# Patient Record
Sex: FEMALE | Race: ASIAN | NOT HISPANIC OR LATINO | Employment: UNEMPLOYED | URBAN - METROPOLITAN AREA
[De-identification: names, ages, dates, MRNs, and addresses within clinical notes are randomized per-mention and may not be internally consistent; named-entity substitution may affect disease eponyms.]

---

## 2022-01-18 ENCOUNTER — MEDICAL CORRESPONDENCE (OUTPATIENT)
Dept: HEALTH INFORMATION MANAGEMENT | Facility: CLINIC | Age: 57
End: 2022-01-18
Payer: COMMERCIAL

## 2022-01-18 ENCOUNTER — TRANSFERRED RECORDS (OUTPATIENT)
Dept: HEALTH INFORMATION MANAGEMENT | Facility: CLINIC | Age: 57
End: 2022-01-18
Payer: COMMERCIAL

## 2022-01-18 LAB
CHOLESTEROL (EXTERNAL): 206 MG/DL
CREATININE (EXTERNAL): 0.88 MG/DL (ref 0.5–1.05)
GFR ESTIMATED (EXTERNAL): 74 ML/MIN/1.73M2
GFR ESTIMATED (IF AFRICAN AMERICAN) (EXTERNAL): 86 ML/MIN/1.73M2
GLUCOSE (EXTERNAL): 99 MG/DL (ref 65–99)
HBA1C MFR BLD: 6 %
HDLC SERPL-MCNC: 73 MG/DL
LDL CHOLESTEROL (EXTERNAL): 97 MG/DL
NON HDL CHOLESTEROL (EXTERNAL): 133 MG/DL
POTASSIUM (EXTERNAL): 3.3 MMOL/L (ref 3.5–5.3)
TRIGLYCERIDES (EXTERNAL): 249 MG/DL
TSH SERPL-ACNC: 0.64 MIU/L

## 2022-02-11 DIAGNOSIS — R05.3 CHRONIC COUGH: Primary | ICD-10-CM

## 2022-03-10 ENCOUNTER — HOSPITAL ENCOUNTER (OUTPATIENT)
Dept: RESPIRATORY THERAPY | Facility: CLINIC | Age: 57
Discharge: HOME | End: 2022-03-10
Admitting: INTERNAL MEDICINE
Payer: COMMERCIAL

## 2022-03-10 DIAGNOSIS — R05.3 CHRONIC COUGH: ICD-10-CM

## 2022-03-10 LAB
DLCOCOR-%PRED-PRE: 165 %
DLCOCOR-PRE: 26.55 ML/MIN/MMHG
DLCOUNC-%PRED-PRE: 170 %
DLCOUNC-PRE: 27.4 ML/MIN/MMHG
DLCOUNC-PRED: 16.05 ML/MIN/MMHG
ERV-%PRED-PRE: 32 %
ERV-PRE: 0.16 L
ERV-PRED: 0.5 L
EXPTIME-PRE: 6.4 SEC
FEF2575-%PRED-POST: 119 %
FEF2575-%PRED-PRE: 98 %
FEF2575-POST: 2.41 L/SEC
FEF2575-PRE: 1.99 L/SEC
FEF2575-PRED: 2.01 L/SEC
FEFMAX-%PRED-PRE: 85 %
FEFMAX-PRE: 4.7 L/SEC
FEFMAX-PRED: 5.52 L/SEC
FEV1-%PRED-PRE: 97 %
FEV1-PRE: 1.87 L
FEV1FEV6-PRE: 83 %
FEV1FEV6-PRED: 82 %
FEV1FVC-PRE: 83 %
FEV1FVC-PRED: 82 %
FEV1SVC-PRE: 91 %
FEV1SVC-PRED: 77 %
FIFMAX-PRE: 3.78 L/SEC
FRCPLETH-%PRED-PRE: 85 %
FRCPLETH-PRE: 1.97 L
FRCPLETH-PRED: 2.3 L
FVC-%PRED-PRE: 96 %
FVC-PRE: 2.27 L
FVC-PRED: 2.35 L
HGB BLD-MCNC: 14.5 G/DL (ref 11.7–15.7)
IC-%PRED-PRE: 95 %
IC-PRE: 1.9 L
IC-PRED: 1.99 L
RVPLETH-%PRED-PRE: 120 %
RVPLETH-PRE: 1.81 L
RVPLETH-PRED: 1.5 L
TLCPLETH-%PRED-PRE: 102 %
TLCPLETH-PRE: 3.87 L
TLCPLETH-PRED: 3.77 L
VA-%PRED-PRE: 109 %
VA-PRE: 3.91 L
VC-%PRED-PRE: 82 %
VC-PRE: 2.06 L
VC-PRED: 2.49 L

## 2022-03-10 PROCEDURE — 85018 HEMOGLOBIN: CPT | Performed by: INTERNAL MEDICINE

## 2022-03-10 PROCEDURE — 94060 EVALUATION OF WHEEZING: CPT | Mod: 26 | Performed by: INTERNAL MEDICINE

## 2022-03-10 PROCEDURE — 94060 EVALUATION OF WHEEZING: CPT

## 2022-03-10 PROCEDURE — 94726 PLETHYSMOGRAPHY LUNG VOLUMES: CPT

## 2022-03-10 PROCEDURE — 94729 DIFFUSING CAPACITY: CPT

## 2022-03-10 PROCEDURE — 999N000157 HC STATISTIC RCP TIME EA 10 MIN

## 2022-03-10 PROCEDURE — 250N000009 HC RX 250: Performed by: INTERNAL MEDICINE

## 2022-03-10 PROCEDURE — 94729 DIFFUSING CAPACITY: CPT | Mod: 26 | Performed by: INTERNAL MEDICINE

## 2022-03-10 PROCEDURE — 36415 COLL VENOUS BLD VENIPUNCTURE: CPT | Performed by: INTERNAL MEDICINE

## 2022-03-10 PROCEDURE — 94726 PLETHYSMOGRAPHY LUNG VOLUMES: CPT | Mod: 26 | Performed by: INTERNAL MEDICINE

## 2022-03-10 RX ORDER — ALBUTEROL SULFATE 0.83 MG/ML
2.5 SOLUTION RESPIRATORY (INHALATION) ONCE
Status: COMPLETED | OUTPATIENT
Start: 2022-03-10 | End: 2022-03-10

## 2022-03-10 RX ADMIN — ALBUTEROL SULFATE 2.5 MG: 2.5 SOLUTION RESPIRATORY (INHALATION) at 09:42

## 2022-03-10 NOTE — PROGRESS NOTES
RESPIRATORY CARE NOTE     Patient Name: Tucker Foreman  Today's Date: 3/10/2022     Complete PFT done with son interpreting. Pt performed tests with good effort. Test results meet ATS criteria. Pt did have 3 puff Ventolin inhaler 2 hrs before PFT test. Results scanned into epic. Pt left in no distress.     Liliana Blevins, RT

## 2022-03-15 ENCOUNTER — OFFICE VISIT (OUTPATIENT)
Dept: PULMONOLOGY | Facility: OTHER | Age: 57
End: 2022-03-15
Payer: COMMERCIAL

## 2022-03-15 ENCOUNTER — HOSPITAL ENCOUNTER (OUTPATIENT)
Dept: RADIOLOGY | Facility: HOSPITAL | Age: 57
Discharge: HOME | End: 2022-03-15
Attending: INTERNAL MEDICINE | Admitting: INTERNAL MEDICINE
Payer: COMMERCIAL

## 2022-03-15 VITALS
WEIGHT: 140 LBS | DIASTOLIC BLOOD PRESSURE: 80 MMHG | SYSTOLIC BLOOD PRESSURE: 122 MMHG | BODY MASS INDEX: 28.22 KG/M2 | RESPIRATION RATE: 16 BRPM | HEART RATE: 86 BPM | HEIGHT: 59 IN

## 2022-03-15 DIAGNOSIS — R05.3 CHRONIC COUGH: Primary | ICD-10-CM

## 2022-03-15 DIAGNOSIS — R05.3 CHRONIC COUGH: ICD-10-CM

## 2022-03-15 PROCEDURE — 71046 X-RAY EXAM CHEST 2 VIEWS: CPT

## 2022-03-15 PROCEDURE — 99204 OFFICE O/P NEW MOD 45 MIN: CPT | Performed by: INTERNAL MEDICINE

## 2022-03-15 RX ORDER — QUETIAPINE FUMARATE 50 MG/1
50 TABLET, FILM COATED ORAL AT BEDTIME
COMMUNITY

## 2022-03-15 RX ORDER — CITALOPRAM HYDROBROMIDE 40 MG/1
40 TABLET ORAL DAILY
COMMUNITY

## 2022-03-15 RX ORDER — IBUPROFEN 800 MG/1
800 TABLET, FILM COATED ORAL EVERY 8 HOURS PRN
COMMUNITY

## 2022-03-15 RX ORDER — PANTOPRAZOLE SODIUM 40 MG/1
40 TABLET, DELAYED RELEASE ORAL DAILY
COMMUNITY
End: 2022-03-15

## 2022-03-15 RX ORDER — LOSARTAN POTASSIUM 100 MG/1
100 TABLET ORAL DAILY
COMMUNITY

## 2022-03-15 RX ORDER — BUDESONIDE AND FORMOTEROL FUMARATE DIHYDRATE 160; 4.5 UG/1; UG/1
2 AEROSOL RESPIRATORY (INHALATION) 2 TIMES DAILY
COMMUNITY

## 2022-03-15 RX ORDER — CETIRIZINE HYDROCHLORIDE 10 MG/1
10 TABLET ORAL DAILY
COMMUNITY

## 2022-03-15 RX ORDER — PANTOPRAZOLE SODIUM 40 MG/1
40 TABLET, DELAYED RELEASE ORAL 2 TIMES DAILY
Qty: 60 TABLET | Refills: 3 | Status: SHIPPED | OUTPATIENT
Start: 2022-03-15 | End: 2022-04-14

## 2022-03-15 RX ORDER — ALBUTEROL SULFATE 90 UG/1
2 AEROSOL, METERED RESPIRATORY (INHALATION) EVERY 6 HOURS PRN
COMMUNITY

## 2022-03-15 NOTE — PATIENT INSTRUCTIONS
It was a pleasure to see you today.    Please increase the pantoprazole from once per day to twice per day every day  We will do an x-ray of your lungs  Please stop the symbicort inhaler, if your cough worsens, please resume it      Follow up in 4-6 weeks. We will consider CT scan of the lungs at that time.        Mariela Tyler MD  Pulmonary and Critical Care Medicine  [facility]  Office: [phone]

## 2022-03-15 NOTE — LETTER
3/15/2022         RE: Tucker Foreman  [address]  [place] [address]        Dear Colleague,    Thank you for referring your patient, Tucker Foreman, to the [facility]. Please see a copy of my visit note below.    Pulmonary Clinic Outpatient Consultation    Assessment and Plan:   57 y F with a history of HTN, HLD, GERD, allergic rhinitis, presenting for an evaluation of chronic cough x 1 year.     Testing and symptoms are noable for GERD symptoms despite once daily PPI. No chest imaging. PFTs with elevated diffusion capacity, otherwise normal. Has not responded to trial of inhalers, adherent to symbicort BID for > 1 month.     We discussed in detail today that the most common etiologies for a chronic cough originate in the upper airway (UACS) rather than the lungs, we can evaluate/rule out any primary pulmonary causes. We also discussed that it is common for chronic cough to have multifactorial etiologies, and we often need to treat/target multiple causes, and even in that case it can be difficult to eliminate the cough entirely if it has been present for a long time.      Recommendations:    Increase PPI to  BID    Trial off inhalers - stop symbicort. Monitor symptoms, resume if cough worsens    Chest x-ray     CT chest if above unremarkable/no benefit    If negative pulmonary evaluation, referral to ENT/Dr. Evelina Rodriguez      Follow up in 4 weeks.    Mariela Tyler MD  Pulmonary and Critical Care Medicine  [facility]  Office: [phone]    ----------------------    Reason for Consult: Chronic cough    HPI:   57 y F with a history of HTN, HLD, GERD, allergic rhinitis, presenting for an evaluation of chronic cough x 1 year. Seen with [employer] .    Feels she has been coughing since she started BP medications  Switched from ACEi to ARB a year ago, no help  Using inhalers as prescribed, BID with symbicort, every day for  Albuterol as needed, some nights   Outpatient  "notes mention CXR, don't see any results from this, she does not recall having this done    Duration of cough: 1 year  Productive: Yes, clear phlegm  What has been tried for cough: symbicort, albuterol, anti-histamine, PPI  Anything helps or worsens: No  Prior ENT or allergy referral: No    Post nasal drip: No  Coughing with oral intake: No  Seasonal allergies: yes   Wheezing/shortness of breath: No  GERD symptoms: Yes  Worse at night: No  Personal or FH of asthma: No    ROS:  A 12-system review was obtained and was negative with the exception of the symptoms endorsed in the history of present illness.    PMH:  HTN, HLD, GERD, allergic rhinitis     PSH:  Related to ovarian cysts    Allergies:  NKDA    Family HX:  No known FH of asthma    Social Hx:  Social History     Socioeconomic History     Marital status: Single     Spouse name: Not on file     Number of children: Not on file     Years of education: Not on file     Highest education level: Not on file   Occupational History     Not on file   Tobacco Use     Smoking status: Never Smoker     Smokeless tobacco: Never Used   Substance and Sexual Activity     Alcohol use: Not on file     Drug use: Not on file     Sexual activity: Not on file   Other Topics Concern     Not on file   Social History Narrative     Not on file     Social Determinants of Health     Financial Resource Strain: Not on file   Food Insecurity: Not on file   Transportation Needs: Not on file   Physical Activity: Not on file   Stress: Not on file   Social Connections: Not on file   Intimate Partner Violence: Not on file   Housing Stability: Not on file   Tobacco: Never smoker  Denies drug, or EtOH abuse  Denies environmental exposure history  From [facility] in [place]  No pets at home    Current Meds:  Reviewed    Physical Exam:  /80 (BP Location: Left arm, Patient Position: Chair, Cuff Size: Adult Regular)   Pulse 86   Resp 16   Ht 1.499 m (4' 11\")   Wt 63.5 kg (140 lb)   BMI " 28.28 kg/m    Gen: Alert, oriented, no distress  HEENT: nasal turbinates are unremarkable, no oropharyngeal lesions, no cervical or supraclavicular lymphadenopathy  CV: RRR, no M/G/R  Resp: CTAB, no focal crackles or wheezes  Abd: soft, nontender, no palpable organomegaly  Skin: no apparent rashes  Ext: warm, no edema  Neuro: alert, nonfocal    Labs:  Reviewed    Imaging studies:    No prior chest imaging    PFT's   Elevated DLCO 165%  Otherwise normal           Again, thank you for allowing me to participate in the care of your patient.        Sincerely,        Mariela Tyler MD

## 2022-03-15 NOTE — PROGRESS NOTES
Pulmonary Clinic Outpatient Consultation    Assessment and Plan:   57 y F with a history of HTN, HLD, GERD, allergic rhinitis, presenting for an evaluation of chronic cough x 1 year.     Testing and symptoms are noable for GERD symptoms despite once daily PPI. No chest imaging. PFTs with elevated diffusion capacity, otherwise normal. Has not responded to trial of inhalers, adherent to symbicort BID for > 1 month.     We discussed in detail today that the most common etiologies for a chronic cough originate in the upper airway (UACS) rather than the lungs, we can evaluate/rule out any primary pulmonary causes. We also discussed that it is common for chronic cough to have multifactorial etiologies, and we often need to treat/target multiple causes, and even in that case it can be difficult to eliminate the cough entirely if it has been present for a long time.      Recommendations:    Increase PPI to  BID    Trial off inhalers - stop symbicort. Monitor symptoms, resume if cough worsens    Chest x-ray     CT chest if above unremarkable/no benefit    If negative pulmonary evaluation, referral to ENT/Dr. Evelina Rodriguez      Follow up in 4 weeks.    Mariela Tyler MD  Pulmonary and Critical Care Medicine  [facility]  Office: [phone]    ----------------------    Reason for Consult: Chronic cough    HPI:   57 y F with a history of HTN, HLD, GERD, allergic rhinitis, presenting for an evaluation of chronic cough x 1 year. Seen with [facility] .    Feels she has been coughing since she started BP medications  Switched from ACEi to ARB a year ago, no help  Using inhalers as prescribed, BID with symbicort, every day for  Albuterol as needed, some nights   Outpatient notes mention CXR, don't see any results from this, she does not recall having this done    Duration of cough: 1 year  Productive: Yes, clear phlegm  What has been tried for cough: symbicort, albuterol, anti-histamine, PPI  Anything helps or  "worsens: No  Prior ENT or allergy referral: No    Post nasal drip: No  Coughing with oral intake: No  Seasonal allergies: yes   Wheezing/shortness of breath: No  GERD symptoms: Yes  Worse at night: No  Personal or FH of asthma: No    ROS:  A 12-system review was obtained and was negative with the exception of the symptoms endorsed in the history of present illness.    PMH:  HTN, HLD, GERD, allergic rhinitis     PSH:  Related to ovarian cysts    Allergies:  NKDA    Family HX:  No known FH of asthma    Social Hx:  Social History     Socioeconomic History     Marital status: Single     Spouse name: Not on file     Number of children: Not on file     Years of education: Not on file     Highest education level: Not on file   Occupational History     Not on file   Tobacco Use     Smoking status: Never Smoker     Smokeless tobacco: Never Used   Substance and Sexual Activity     Alcohol use: Not on file     Drug use: Not on file     Sexual activity: Not on file   Other Topics Concern     Not on file   Social History Narrative     Not on file     Social Determinants of Health     Financial Resource Strain: Not on file   Food Insecurity: Not on file   Transportation Needs: Not on file   Physical Activity: Not on file   Stress: Not on file   Social Connections: Not on file   Intimate Partner Violence: Not on file   Housing Stability: Not on file   Tobacco: Never smoker  Denies drug, or EtOH abuse  Denies environmental exposure history  From [facility] in [facility]  No pets at home    Current Meds:  Reviewed    Physical Exam:  /80 (BP Location: Left arm, Patient Position: Chair, Cuff Size: Adult Regular)   Pulse 86   Resp 16   Ht 1.499 m (4' 11\")   Wt 63.5 kg (140 lb)   BMI 28.28 kg/m    Gen: Alert, oriented, no distress  HEENT: nasal turbinates are unremarkable, no oropharyngeal lesions, no cervical or supraclavicular lymphadenopathy  CV: RRR, no M/G/R  Resp: CTAB, no focal crackles or wheezes  Abd: soft, " nontender, no palpable organomegaly  Skin: no apparent rashes  Ext: warm, no edema  Neuro: alert, nonfocal    Labs:  Reviewed    Imaging studies:    No prior chest imaging    PFT's   Elevated DLCO 165%  Otherwise normal

## 2022-03-21 ENCOUNTER — TELEPHONE (OUTPATIENT)
Dept: PULMONOLOGY | Facility: OTHER | Age: 57
End: 2022-03-21
Payer: COMMERCIAL

## 2022-03-21 NOTE — TELEPHONE ENCOUNTER
Called patient with [facility]  to reviewed normal CXR result.  Confirmed with the patient she stopped inhalers and increased the PPI to BID. Cough is unchanged with these interventions.     Discussed CT scan of the chest as next step. She declined to do this at this time.  Has follow up in our clinic 4/26. Continue BID PPI until then. Will likely need further referral for UACS etiologies.       Mariela Tyler MD  Pulmonary and Critical Care Medicine  [facility]  Office: [phone]

## 2022-04-22 PROBLEM — I10 ESSENTIAL HYPERTENSION: Status: ACTIVE | Noted: 2018-08-17

## 2022-04-22 RX ORDER — CARBOXYMETHYLCELLULOSE SODIUM 0.5 G/100ML
SOLUTION/ DROPS OPHTHALMIC
COMMUNITY
Start: 2021-07-22

## 2022-04-22 RX ORDER — LOSARTAN POTASSIUM 50 MG/1
TABLET ORAL
COMMUNITY
Start: 2021-12-02

## 2022-04-22 RX ORDER — METOPROLOL SUCCINATE 100 MG/1
TABLET, EXTENDED RELEASE ORAL
COMMUNITY
Start: 2022-03-15

## 2022-04-22 RX ORDER — POLYETHYLENE GLYCOL AND PROPYLENE GLYCOL 4; 3 MG/ML; MG/ML
SOLUTION/ DROPS OPHTHALMIC
COMMUNITY
Start: 2022-01-19

## 2022-04-22 RX ORDER — TOPIRAMATE 25 MG/1
TABLET, FILM COATED ORAL
COMMUNITY
Start: 2022-03-02

## 2022-04-22 RX ORDER — CHLORAL HYDRATE 500 MG
CAPSULE ORAL
COMMUNITY
Start: 2021-06-22

## 2022-04-22 NOTE — PROGRESS NOTES
Pulmonary Clinic Outpatient Follow-Up  4/26/2022     Assessment and Plan:   #. Chronic cough, unchanged with and without high-dose ICS/LABA inhaler. At this point we have no evidence that there is an asthmatic component to her symptoms.   #. GERD, currently on PPI twice daily, but without any change to her cough.        Tessalon TID PRN -- Rx sent, can be refilled by her PCP in the future    ENT [facility] to the [facility]    RTC: PRN. If patient has no success w/ Tessalon & ENT, she is willing to consider a CT chest (non-contrast). I am concerned that it will be low-yield. If she is interested in doing this, I would request that her PCP order the study & notify either myself or Dr. Tyler when it is done -- we would be happy to review & offer any pulmonary guidance, if applicable. Otherwise, patient is safe to discharge from our clinic.     Ila Garcia MD  Pulmonary and Critical Care   ______________________________________________________________________________    CC: follow up    HPI:   Tucker Foreman is a 57 year old never smoker with history of HTN, GERD, allergic rhinitis, and chronic cough, presenting today for follow up of her cough.  She was seen by Dr. Tyler on 3/15/2022; at that time she was noted to have symptomatic GERD despite daily PPI, normal PFTs with elevated diffusion capacity, and lack of response to Symbicort.  During the appointment her PPI was increased to twice daily and her Symbicort was stopped (with a plan to restart if her cough recurred).  On a follow-up phone call in a week her cough has remained unchanged, patient declined chest CT at that time.    Reports that she still has her cough. It did not change w/ BID pantoprazole. She has not used Symbicort for a month & her cough is unchanged. This is obviously frustrating. She is hoping that we can get her something that would offer her symptomatic relief while she is getting her evaluations. She is willing to try Tessalon, w/ an  understanding that it may or may not work.     REVIEW OF SYSTEMS: 10-point ROS was negative with exceptions as detailed in the HPI.    Physical Exam:  BP (!) 157/93 (BP Location: Right arm, Patient Position: Sitting, Cuff Size: Adult Large)   Pulse 86   Wt 62.8 kg (138 lb 6 oz)   SpO2 96%   BMI 27.95 kg/m    Gen: alert, oriented, no distress  HEENT: masked, PERLL; no stridor, no cough during our visit  CV: deferred   Resp: talking in full sentences w/ no respiratory distress  Abd: deferred  Skin: no apparent rashes  Ext: no cyanosis, clubbing or edema  Neuro: alert, nonfocal    Labs: personally reviewed & interpreted in EMR.   Imaging & Procedures: personally reviewed & interpreted, including formal Radiology reports in the EMR.    MEDICAL HISTORY: Hypertension, chronic cough, left-sided ovarian cyst, costochondritis, headaches, hyperlipidemia, depression, PTSD, GERD, LGSIL Pap smear, allergic rhinitis, back pain  SURGICAL HISTORY: Left-sided laparoscopic salpingooophorectomy  SOCIAL HISTORY:  reports that she has never smoked. She has never used smokeless tobacco.  She does not drink alcohol.  FAMILY HISTORY: family history is not on file.    MEDICATIONS: personally reviewed, including EMR/Care Everywhere. Pertinent information noted & updated.   ALLERGIES: No Known Allergies

## 2022-04-26 ENCOUNTER — OFFICE VISIT (OUTPATIENT)
Dept: PULMONOLOGY | Facility: OTHER | Age: 57
End: 2022-04-26
Payer: COMMERCIAL

## 2022-04-26 VITALS
HEART RATE: 86 BPM | SYSTOLIC BLOOD PRESSURE: 157 MMHG | WEIGHT: 138.38 LBS | DIASTOLIC BLOOD PRESSURE: 93 MMHG | BODY MASS INDEX: 27.95 KG/M2 | OXYGEN SATURATION: 96 %

## 2022-04-26 DIAGNOSIS — R05.9 COUGH: Primary | ICD-10-CM

## 2022-04-26 PROCEDURE — 99213 OFFICE O/P EST LOW 20 MIN: CPT | Performed by: INTERNAL MEDICINE

## 2022-04-26 RX ORDER — BENZONATATE 200 MG/1
200 CAPSULE ORAL 3 TIMES DAILY PRN
Qty: 90 CAPSULE | Refills: 4 | Status: SHIPPED | OUTPATIENT
Start: 2022-04-26

## 2022-04-26 NOTE — PATIENT INSTRUCTIONS
Plan:  I am sending you a medication to see if it can help with your cough. You can take one tablet AS NEEDED up to 3 times per day.   I am putting a referral to cough specialists in the ENT (Ear Nose & Throat) clinic at the [facility]. You will get a call from the clinic to schedule this.    If you have any questions or concerns, please, call our clinic at [phone].

## 2022-05-02 ENCOUNTER — APPOINTMENT (OUTPATIENT)
Dept: INTERPRETER SERVICES | Facility: CLINIC | Age: 57
End: 2022-05-02
Payer: COMMERCIAL

## 2022-05-02 ENCOUNTER — TRANSFERRED RECORDS (OUTPATIENT)
Dept: OTOLARYNGOLOGY | Facility: CLINIC | Age: 57
End: 2022-05-02

## 2022-05-05 ENCOUNTER — TRANSFERRED RECORDS (OUTPATIENT)
Dept: HEALTH INFORMATION MANAGEMENT | Facility: CLINIC | Age: 57
End: 2022-05-05
Payer: COMMERCIAL

## 2022-05-16 NOTE — TELEPHONE ENCOUNTER
FUTURE VISIT INFORMATION      FUTURE VISIT INFORMATION:    Date: 8/4/22    Time: 10AM    Location: [facility]  REFERRAL INFORMATION:    Referring provider:  Ila Garcia MD    Referring providers clinic:  [facility]     Reason for visit/diagnosis  chronic cough, ruled out pulmonary & GERD component, ef by Ila Garcia MD in [facility], recs in epic - sched per pt    RECORDS REQUESTED FROM:       Clinic name Comments Records Status Imaging Status    [facility]  4/26/22 note and referral from Ila Garcia MD Epic    Imaging 3/15/22 XR Chest [facility]

## 2022-06-08 ENCOUNTER — HOSPITAL ENCOUNTER (OUTPATIENT)
Dept: ULTRASOUND IMAGING | Facility: HOSPITAL | Age: 57
Discharge: HOME | End: 2022-06-08
Attending: FAMILY MEDICINE | Admitting: FAMILY MEDICINE
Payer: COMMERCIAL

## 2022-06-08 DIAGNOSIS — R09.89 RIGHT CAROTID BRUIT: ICD-10-CM

## 2022-06-08 PROCEDURE — 93880 EXTRACRANIAL BILAT STUDY: CPT

## 2022-08-02 NOTE — PROGRESS NOTES
[facility]   at the [facility]   Otolaryngology Clinic     Patient: Tucker Foreman    MRN: [id number]    : 1965    Age/Gender: 57 year old female  Date of Service: 2022  Rendering Provider:   Evelina Rodriguez MD     Referring Provider   PCP: Roxana Lee [facility]  Referring Physician: Ila Garcia MD  [address]  [place] [address]  Reason for Consultation   Chronic cough  Dyspnea  History   HISTORY OF PRESENT ILLNESS: Ms. Foreman is a 57 year old female who presents to us today with chronic cough.      Of note she has a history of GERD on daily PPI and no history of asthma. Her preferred language is Hmong.     Her PCP Dr. Roxana Lee is at:  [place] [id number] [phone]    she presents today for evaluation. she reports:    - she has intermittent rhinorrhea  - she doesn't use any medications in her nose    - she started losartan last recently   - decreased blood pressure to 120's   - visited PCP last week who recommended continuing this  - has seen her current PCP since  when she moved to [place] from [place]  - her children at home can understand English  - her son arrived to the appointment with her     Dysphonia  - denies   - comes on when she coughs a lot     Dysphagia  - denies    Dyspnea  - denies  - she was taking inhalers   - discontinued this as they were not working       Throat clearing/cough  - unable to sleep due to cough   - wakes up from coughing when she is sleeping  - she stays up until 3-4 AM   - scared to sleep due to cough   - wakes up a few hours later with cough   - cough is then constant throughout day   - cough is not worsened with eating   - cough improves if she swallows  - carries water bottle around with her at all times  - if she drinks water once coughing starts cough improves  - throat feels dry   - drinks lots of water to help with throat pain   - no voice changes after  episodes of severe coughing     GERD/LPRD   - history of GERD  - not taking any medications for this  - taking medication for ulcer  - been having stomach pain   - denies history of GI scope exam   - pain in her stomach/chest when she is hungry or eats too much  - her  has similar issues and takes the same medication    PAST MEDICAL HISTORY: hypertension, left ovarian cyst, chronic cough, hyperlipidemia, depression, PTSD, GERD, pap smear abnormality of cervix with LGSIL, allergic rhinitis    PAST SURGICAL HISTORY: laparoscopic left salpingo-oophorectomy     CURRENT MEDICATIONS:   Current Outpatient Medications:      albuterol (PROAIR HFA/PROVENTIL HFA/VENTOLIN HFA) 108 (90 Base) MCG/ACT inhaler, Inhale 2 puffs into the lungs every 6 hours as needed for shortness of breath / dyspnea or wheezing, Disp: , Rfl:      benzonatate (TESSALON) 200 MG capsule, Take 1 capsule (200 mg) by mouth 3 times daily as needed for cough, Disp: 90 capsule, Rfl: 4     budesonide-formoterol (SYMBICORT) 160-4.5 MCG/ACT Inhaler, Inhale 2 puffs into the lungs 2 times daily, Disp: , Rfl:      cetirizine (ZYRTEC) 10 MG tablet, Take 10 mg by mouth daily, Disp: , Rfl:      citalopram (CELEXA) 40 MG tablet, Take 40 mg by mouth daily, Disp: , Rfl:      EYE ITCH RELIEF 0.025 % ophthalmic solution, PLACE 1 DROP IN EACH EYE 2 TIMES A DAY (EVERY 8 TO 12 HOURS) AS NEEDED, Disp: , Rfl:      fish oil-omega-3 fatty acids 1000 MG capsule, TAKE 1 PILL BY MOUTH TWICE DAILY/ TXHUA HNUB NOJ 1 LUB 2 ZAUG PAB [facility] ROJ, Disp: , Rfl:      ibuprofen (ADVIL/MOTRIN) 800 MG tablet, Take 800 mg by mouth every 8 hours as needed for moderate pain, Disp: , Rfl:      losartan (COZAAR) 100 MG tablet, Take 100 mg by mouth daily, Disp: , Rfl:      losartan (COZAAR) 50 MG tablet, TAKE 1 PILL BY MOUTH DAILY FOR BLOOD PRESSURE / TXHUA HNUB NOJ 1 LUB TSHUAJ PAB MARIA ALEJANDRAO JERMAN [facility] SIAB, Disp: , Rfl:      LUBRICATING PLUS EYE DROPS 0.5 % ophthalmic solution, PF  FORMULATION. PUT 1-2 DROPS IN EACH EYE AS NEEDED. DISCARD EACH VIAL AFTER SINGLE USE., Disp: , Rfl:      metoprolol succinate ER (TOPROL-XL) 100 MG 24 hr tablet, TAKE 1 PILL BY MOUTH DAILY FOR BLOOD PRESSURE / TXHUA HNUB NOJ 1 LUB [facility] ELIEL BESSO JERMAN NTSHAV SIAB, Disp: , Rfl:      NONFORMULARY, OTC dry eyes drops as needed, Disp: , Rfl:      polyethylene glycol-propylene glycol (SYSTANE ULTRA) 0.4-0.3 % SOLN ophthalmic solution, Place 1 drop into both eyes 4 times daily as needed for dry eyes, Disp: , Rfl:      QUEtiapine (SEROQUEL) 50 MG tablet, Take 50 mg by mouth At Bedtime, Disp: , Rfl:      SYSTANE PRESERVATIVE FREE 0.4-0.3 % SOLN opthalmic solution, PUT 1-2 DROPS IN EACH EYE 4 TIMES A DAY AS NEEDED. DISCARD EACH VIAL AFTER SINGLE USE, Disp: , Rfl:      topiramate (TOPAMAX) 25 MG tablet, TAKE 1 PILL BY MOUTH EVERY DAY FOR HEADACHE/TXHUA HNUB NOJ 1 LUB [facility] JERMAN [facility]OB MELISSA GREGORIO *NEEDS TO BE SEEN*, Disp: , Rfl:     ALLERGIES: Patient has no known allergies.    SOCIAL HISTORY:    Social History     Socioeconomic History     Marital status: Single     Spouse name: Not on file     Number of children: Not on file     Years of education: Not on file     Highest education level: Not on file   Occupational History     Not on file   Tobacco Use     Smoking status: Never Smoker     Smokeless tobacco: Never Used   Substance and Sexual Activity     Alcohol use: Not on file     Drug use: Not on file     Sexual activity: Not on file   Other Topics Concern     Not on file   Social History Narrative     Not on file     Social Determinants of Health     Financial Resource Strain: Not on file   Food Insecurity: Not on file   Transportation Needs: Not on file   Physical Activity: Not on file   Stress: Not on file   Social Connections: Not on file   Intimate Partner Violence: Not on file   Housing Stability: Not on file         FAMILY HISTORY: No family history on file.  Non-contributory for problems with anesthesia    REVIEW OF  SYSTEMS:   The patient was asked a 14 point review of systems regarding constitutional symptoms, eye symptoms, ears, nose, mouth, throat symptoms, cardiovascular symptoms, respiratory symptoms, gastrointestinal symptoms, genitourinary symptoms, musculoskeletal symptoms, integumentary symptoms, neurological symptoms, psychiatric symptoms, endocrine symptoms, hematologic/lymphatic symptoms, and allergic/ immunologic symptoms.   The pertinent factors have been included in the HPI and below.  Patient Supplied Answers to Review of Systems  No flowsheet data found.    Physical Examination   The patient underwent a physical examination as described below. The pertinent positive and negative findings are summarized after the description of the examination.  Constitutional: The patient's developmental and nutritional status was assessed. The patient's voice quality was assessed.  Head and Face: The head and face were inspected for deformities. The sinuses were palpated. The salivary glands were palpated. Facial muscle strength was assessed bilaterally.  Eyes: Extraocular movements and primary gaze alignment were assessed.  Ears, Nose, Mouth and Throat: The ears and nose were examined for deformities. The nasal septum, mucosa, and turbinates were inspected by anterior rhinoscopy. The lips, teeth, and gums were examined for abnormalities. The oral mucosa, tongue, palate, tonsils, lateral and posterior pharynx were inspected for the presence of asymmetry or mucosal lesions.    Neck: The tracheal position was noted, and the neck mass palpated to determine if there were any asymmetries, abnormal neck masses, thyromegally, or thyroid nodules.  Respiratory: The nature of the breathing and chest expansion/symmetry was observed.  Cardiovascular: The patient was examined to determine the presence of any edema or jugular venous distension.  Abdomen: The contour of the abdomen was noted.  Lymphatic: The patient was examined for  infraclavicular lymphadenopathy.  Musculoskeletal: The patient was inspected for the presence of skeletal deformities.  Extremities: The extremities were examined for any clubbing or cyanosis.  Skin: The skin was examined for inflammatory or neoplastic conditions.  Neurologic: The patient's orientation, mood, and affect were noted. The cranial nerve  functions were examined.  Other pertinent positive and negative findings on physical examination:      OC/OP: no lesions, uvula midline, soft palate elevates symmetrically   Neck: no lesions, no TH tenderness to palpation     All other physical examination findings were within normal limits and noncontributory.  Procedures     Flexible laryngoscopy (CPT 32852)      Pre-procedure diagnosis: chronic cough  Post-procedure diagnosis: same as above  Indication for procedure: Ms. Foreman is a 57 year old female with see above  Procedure(s): Fiberoptic Laryngoscopy    Details of Procedure: After informed consent was obtained, the patient was seated in the examination chair.  The areas of the nasopharynx as well as the hypopharynx were anesthetized with topical 4% lidocaine with 0.25% phenylephrine atomizer.  Examination of the base of tongue was performed first.  Attention was directed to any evidence of masses in the area or evidence of leukoplakia or candidal infection.  Attention was directed to the epiglottis where its size and position was determined and its movement on phonation of the vowel  e .  The piriform sinuses were then inspected for any mass lesions or pooling of secretions.  Attention was then directed to the larynx. The vocal folds were inspected for infection or any areas of leukoplakia, for masses, polypoid degeneration, or hemorrhage.  Having done this, the arytenoids and vocal processes were inspected for erythema or evidence of granuloma formation.  The posterior commissure was then inspected for evidence of inflammatory changes in the mucosa and heaping up  of mucosal tissue. The patient was then instructed to say the vowel  e .  Adduction of vocal folds to the midline was observed for any evidence of paresis or paralysis of the larynx or asymmetry in rotation of the larynx to the left or right. The patient was asked to breathe and the degree of abduction was noted bilaterally.  Subglottic view of the larynx was obtained for any additional mass lesions or mucosal changes.  Finally the post cricoid was examined for evidence of pooling of secretions, as well as the pharyngeal wall mucosa.   Anesthesia type: 0.25% phenylephrine    Findings:  Anatomic/physiological deviations: LNC, postcricoid edema, vocal fold with mild erythema with question of fungal laryngitis    Right vocal process: No restriction of mobility   Left vocal process: No restriction of mobility  Glottal gap: Complete glottal closure  Supraglottic structures: Normal  Hypopharynx: Normal     Estimated Blood Loss: minimal  Complications: None  Disposition: Patient tolerated the procedure well                      Fiberoptic Endoscopic Evaluation of Swallowing (CPT 96007)  and Interpretation of Swallowing (CPT 46848)    Indications: See above notes for complete history and physical. Patient complains of dysphagia to both solids and liquids and/or there is suggestion on history and endoscopic exam of the presence of dysphagia causing medical complaints.  Swallowing evaluation is being performed to assess the presence and degree of dysphagia, and to recommend a safe diet.     Pulmonary Status:  No PNA   Current Diet:              regular                                        thin liquids      Consistency Amounts:  Thin Liquid: sip   Puree: 1 tsp  Solid: cookies            Positioning: upright in a chair  Oral Peripheral Exam: See physical exam section.  Anatomic Notes: See Videostroboscopy report for assessment of anatomy and laryngeal functioning  Pharyngeal secretions prior to administration of liquid or  food: No  Oral Phase Abnormal Findings: No abnormal behavior observed  Behavioral Adaptations: No abnormal behavior observed  Pharyngeal Phase Abnormal Findings: no penetration, no aspiration    Recommended Diet:  regular                                        thin liquids             Review of Relevant Clinical Data   I personally reviewed:  Notes:   Pulmonology 4/26/22  Chronic cough, unchanged with and without high-dose ICS/LABA inhaler. At this point we have no evidence that there is an asthmatic component to her symptoms.   #. GERD, currently on PPI twice daily, but without any change to her cough.      Tessalon TID PRN -- Rx sent, can be refilled by her PCP in the future    [facility]  RTC: PRN. If patient has no success w/ Tessalon & ENT, she is willing to consider a CT chest (non-contrast). I am concerned that it will be low-yield. If she is interested in doing this, I would request that her PCP order the study & notify either myself or Dr. Tyler when it is done -- we would be happy to review & offer any pulmonary guidance, if applicable. Otherwise, patient is safe to discharge from our clinic.     Radiology:   XR Chest 3/15/22  IMPRESSION: Normal cardiac and mediastinal contours. The lungs are symmetrically inflated and are clear.  Upper abdomen is unremarkable.     Procedures:   PFT 3/10/22   IMPRESSION:   Normal pulmonary function.     Labs:  No results found for: TSH  No results found for: NA, CO2, BUN, CREAT, GLUCOSE, PHOS  Lab Results   Component Value Date    HGB 14.5 03/10/2022     No results found for: PT, PTT, INR  No results found for: HARVEY  No components found for: RHEUMATOIDFACTOR,  RF  No results found for: CRP  No components found for: CKTOT, URICACID  No components found for: C3, C4, DSDNAAB, NDNAABIFA  No results found for: MPOAB    Patient reported Quality of Life (QOL) Measures   Patient Supplied Answers To VHI Questionnaire  No flowsheet data  found.      Patient Supplied Answers To EAT Questionnaire  No flowsheet data found.      Patient Supplied Answers To CSI Questionnaire  No flowsheet data found.         Impression & Plan     IMPRESSION: Ms. Foreman is a 57 year old female who is being seen for the followin. Chronic cough  - started in  in the setting of blood pressure medication   - never used cigarettes  - chest xray  - clear  - on losartan  - allergy triggers and work up: denies  - pulmonary triggers and work up: PFTs 3/10/22 - normal, referred by Dr Garcia, tried inhalers without benefit  - GI triggers and work up: does feel reflux, was told she might have an ulcer but she does not have an EGD, was given reflux meds by her primary, she can't sleep at night due to cough, cough is worse in the morning   - ENT triggers and work up: worse with talking  - scope shows post cricoid ededma, vocal fold with mild erythema with question of fungal laryngitis  - FEES shows safe swallow   - symptoms likely due to ARB BP medication, reflux etiology and irritable larynx syndrome, also has vocal fold erythema so will treat for possible bacterial or fungal laryngitis given history of steroid   - will treat for reflux and obtain esophageal work up   - needs to come off ARB since it can contribute to her cough  Plan  - referral for Dr. Solo Bautista for EGD, pH testing and manometry   - discontinue losartan, no ARB or ACE-I type blood pressure medications - will forward this request to her PCP  - will report name, dosing, and frequency of reflux medication - need to confirm BID PPI dosing  - if not on this - she will need omeprazole 40mg BID prescribed  - will prescribe Augmentin, nystatin, and fluconazole       RETURN VISIT:  at 2 PM    Scribe Disclosure:  Debora OROZCO am serving as a scribe to document services personally performed by Evelina Rodriguez MD at this visit, based upon the provider's statements to me. All documentation has been  reviewed by the aforementioned provider prior to being entered into the official medical record.       Thank you for the kind referral and for allowing me to share in the care of Ms. Foreman. If you have any questions, please do not hesitate to contact me.    Evelina Rodriguez MD    Laryngology    [facility]  Department of  Otolaryngology - Head and Neck Surgery  Clinics & Surgery Center  [address] [place] [address]  Appointment line: [phone]  Fax: [phone]  [email or web address]

## 2022-08-04 ENCOUNTER — OFFICE VISIT (OUTPATIENT)
Dept: OTOLARYNGOLOGY | Facility: CLINIC | Age: 57
End: 2022-08-04
Attending: INTERNAL MEDICINE
Payer: COMMERCIAL

## 2022-08-04 ENCOUNTER — PRE VISIT (OUTPATIENT)
Dept: OTOLARYNGOLOGY | Facility: CLINIC | Age: 57
End: 2022-08-04
Payer: COMMERCIAL

## 2022-08-04 ENCOUNTER — TELEPHONE (OUTPATIENT)
Dept: OTOLARYNGOLOGY | Facility: CLINIC | Age: 57
End: 2022-08-04

## 2022-08-04 ENCOUNTER — THERAPY VISIT (OUTPATIENT)
Dept: SPEECH THERAPY | Facility: CLINIC | Age: 57
End: 2022-08-04
Payer: COMMERCIAL

## 2022-08-04 ENCOUNTER — DOCUMENTATION ONLY (OUTPATIENT)
Dept: OTOLARYNGOLOGY | Facility: CLINIC | Age: 57
End: 2022-08-04

## 2022-08-04 VITALS
SYSTOLIC BLOOD PRESSURE: 172 MMHG | BODY MASS INDEX: 26.13 KG/M2 | HEIGHT: 59 IN | DIASTOLIC BLOOD PRESSURE: 96 MMHG | HEART RATE: 74 BPM | WEIGHT: 129.6 LBS | OXYGEN SATURATION: 97 %

## 2022-08-04 DIAGNOSIS — R05.3 CHRONIC COUGH: Primary | ICD-10-CM

## 2022-08-04 DIAGNOSIS — R49.0 DYSPHONIA: ICD-10-CM

## 2022-08-04 DIAGNOSIS — B37.89 LARYNGEAL CANDIDIASIS: ICD-10-CM

## 2022-08-04 DIAGNOSIS — J38.7 IRRITABLE LARYNX SYNDROME: ICD-10-CM

## 2022-08-04 DIAGNOSIS — R05.9 COUGH: Primary | ICD-10-CM

## 2022-08-04 DIAGNOSIS — K21.9 LARYNGOPHARYNGEAL REFLUX: ICD-10-CM

## 2022-08-04 PROCEDURE — 99204 OFFICE O/P NEW MOD 45 MIN: CPT | Mod: 25 | Performed by: OTOLARYNGOLOGY

## 2022-08-04 PROCEDURE — 92610 EVALUATE SWALLOWING FUNCTION: CPT | Mod: GN | Performed by: SPEECH-LANGUAGE PATHOLOGIST

## 2022-08-04 PROCEDURE — 92524 BEHAVRAL QUALIT ANALYS VOICE: CPT | Mod: GN | Performed by: SPEECH-LANGUAGE PATHOLOGIST

## 2022-08-04 PROCEDURE — 92613 ENDOSCOPY SWALLOW (FEES) I&R: CPT | Performed by: OTOLARYNGOLOGY

## 2022-08-04 PROCEDURE — 92612 ENDOSCOPY SWALLOW (FEES) VID: CPT | Mod: GN | Performed by: OTOLARYNGOLOGY

## 2022-08-04 RX ORDER — FLUCONAZOLE 100 MG/1
TABLET ORAL
Qty: 11 TABLET | Refills: 0 | Status: SHIPPED | OUTPATIENT
Start: 2022-08-04 | End: 2022-08-23

## 2022-08-04 RX ORDER — NYSTATIN 100000/ML
SUSPENSION, ORAL (FINAL DOSE FORM) ORAL 4 TIMES DAILY
Qty: 280 ML | Refills: 0 | Status: SHIPPED | OUTPATIENT
Start: 2022-08-04 | End: 2022-08-23

## 2022-08-04 RX ORDER — NYSTATIN 100000/ML
SUSPENSION, ORAL (FINAL DOSE FORM) ORAL 4 TIMES DAILY
Qty: 280 ML | Refills: 0 | Status: SHIPPED | OUTPATIENT
Start: 2022-08-04 | End: 2022-08-04

## 2022-08-04 ASSESSMENT — PAIN SCALES - GENERAL: PAINLEVEL: NO PAIN (0)

## 2022-08-04 NOTE — LETTER
2022       RE: Tucker Foreman  [address]  [place] [address]     Dear Colleague,    Thank you for referring your patient, Tucker Foreman, to the [facility] EAR NOSE AND THROAT CLINIC [place] at [facility]. Please see a copy of my visit note below.        [facility]   at the [facility]   Otolaryngology Clinic     Patient: Tucker Foreman    MRN: [id number]    : 1965    Age/Gender: 57 year old female  Date of Service: 2022  Rendering Provider:   Evelina Rodriguez MD     Referring Provider   PCP: Roxana Lee [place] [facility]  Referring Physician: Ila Garcia MD  [address] [address]  Reason for Consultation   Chronic cough  Dyspnea  History   HISTORY OF PRESENT ILLNESS: Ms. Foreman is a 57 year old female who presents to us today with chronic cough.      Of note she has a history of GERD on daily PPI and no history of asthma. Her preferred language is Hmong.     Her PCP Dr. Roxana Lee is at:  [place] [phone]    she presents today for evaluation. she reports:    - she has intermittent rhinorrhea  - she doesn't use any medications in her nose    - she started losartan last recently   - decreased blood pressure to 120's   - visited PCP last week who recommended continuing this  - has seen her current PCP since  when she moved to [place] from [place]  - her children at home can understand English  - her son arrived to the appointment with her     Dysphonia  - denies   - comes on when she coughs a lot     Dysphagia  - denies    Dyspnea  - denies  - she was taking inhalers   - discontinued this as they were not working       Throat clearing/cough  - unable to sleep due to cough   - wakes up from coughing when she is sleeping  - she stays up until 3-4 AM   - scared to sleep due to cough   - wakes up a few hours later with cough   - cough  is then constant throughout day   - cough is not worsened with eating   - cough improves if she swallows  - carries water bottle around with her at all times  - if she drinks water once coughing starts cough improves  - throat feels dry   - drinks lots of water to help with throat pain   - no voice changes after episodes of severe coughing     GERD/LPRD   - history of GERD  - not taking any medications for this  - taking medication for ulcer  - been having stomach pain   - denies history of GI scope exam   - pain in her stomach/chest when she is hungry or eats too much  - her  has similar issues and takes the same medication    PAST MEDICAL HISTORY: hypertension, left ovarian cyst, chronic cough, hyperlipidemia, depression, PTSD, GERD, pap smear abnormality of cervix with LGSIL, allergic rhinitis    PAST SURGICAL HISTORY: laparoscopic left salpingo-oophorectomy     CURRENT MEDICATIONS:   Current Outpatient Medications:      albuterol (PROAIR HFA/PROVENTIL HFA/VENTOLIN HFA) 108 (90 Base) MCG/ACT inhaler, Inhale 2 puffs into the lungs every 6 hours as needed for shortness of breath / dyspnea or wheezing, Disp: , Rfl:      benzonatate (TESSALON) 200 MG capsule, Take 1 capsule (200 mg) by mouth 3 times daily as needed for cough, Disp: 90 capsule, Rfl: 4     budesonide-formoterol (SYMBICORT) 160-4.5 MCG/ACT Inhaler, Inhale 2 puffs into the lungs 2 times daily, Disp: , Rfl:      cetirizine (ZYRTEC) 10 MG tablet, Take 10 mg by mouth daily, Disp: , Rfl:      citalopram (CELEXA) 40 MG tablet, Take 40 mg by mouth daily, Disp: , Rfl:      EYE ITCH RELIEF 0.025 % ophthalmic solution, PLACE 1 DROP IN EACH EYE 2 TIMES A DAY (EVERY 8 TO 12 HOURS) AS NEEDED, Disp: , Rfl:      fish oil-omega-3 fatty acids 1000 MG capsule, TAKE 1 PILL BY MOUTH TWICE DAILY/ ROEL RANDOLPH NOJ 1 LUB 2 KATHY MARTINV MUSARAHJ ROAV, Disp: , Rfl:      ibuprofen (ADVIL/MOTRIN) 800 MG tablet, Take 800 mg by mouth every 8 hours as needed for moderate pain,  Disp: , Rfl:      losartan (COZAAR) 100 MG tablet, Take 100 mg by mouth daily, Disp: , Rfl:      losartan (COZAAR) 50 MG tablet, TAKE 1 PILL BY MOUTH DAILY FOR BLOOD PRESSURE / TXHUA HNUB NOJ 1 LUB [facility] SIAB, Disp: , Rfl:      LUBRICATING PLUS EYE DROPS 0.5 % ophthalmic solution, PF FORMULATION. PUT 1-2 DROPS IN EACH EYE AS NEEDED. DISCARD EACH VIAL AFTER SINGLE USE., Disp: , Rfl:      metoprolol succinate ER (TOPROL-XL) 100 MG 24 hr tablet, TAKE 1 PILL BY MOUTH DAILY FOR BLOOD PRESSURE / TXHUA HNUB NOJ 1 [facility] SIAB, Disp: , Rfl:      NONFORMULARY, OTC dry eyes drops as needed, Disp: , Rfl:      polyethylene glycol-propylene glycol (SYSTANE ULTRA) 0.4-0.3 % SOLN ophthalmic solution, Place 1 drop into both eyes 4 times daily as needed for dry eyes, Disp: , Rfl:      QUEtiapine (SEROQUEL) 50 MG tablet, Take 50 mg by mouth At Bedtime, Disp: , Rfl:      SYSTANE PRESERVATIVE FREE 0.4-0.3 % SOLN opthalmic solution, PUT 1-2 DROPS IN EACH EYE 4 TIMES A DAY AS NEEDED. DISCARD EACH VIAL AFTER SINGLE USE, Disp: , Rfl:      topiramate (TOPAMAX) 25 MG tablet, TAKE 1 PILL BY MOUTH EVERY DAY FOR HEADACHE/TXHUA HNUB NOJ 1 [facility] MOB MELISSA GREGORIO *NEEDS TO BE SEEN*, Disp: , Rfl:     ALLERGIES: Patient has no known allergies.    SOCIAL HISTORY:    Social History     Socioeconomic History     Marital status: Single     Spouse name: Not on file     Number of children: Not on file     Years of education: Not on file     Highest education level: Not on file   Occupational History     Not on file   Tobacco Use     Smoking status: Never Smoker     Smokeless tobacco: Never Used   Substance and Sexual Activity     Alcohol use: Not on file     Drug use: Not on file     Sexual activity: Not on file   Other Topics Concern     Not on file   Social History Narrative     Not on file     Social Determinants of Health     Financial Resource Strain: Not on file   Food Insecurity: Not on file    Transportation Needs: Not on file   Physical Activity: Not on file   Stress: Not on file   Social Connections: Not on file   Intimate Partner Violence: Not on file   Housing Stability: Not on file         FAMILY HISTORY: No family history on file.  Non-contributory for problems with anesthesia    REVIEW OF SYSTEMS:   The patient was asked a 14 point review of systems regarding constitutional symptoms, eye symptoms, ears, nose, mouth, throat symptoms, cardiovascular symptoms, respiratory symptoms, gastrointestinal symptoms, genitourinary symptoms, musculoskeletal symptoms, integumentary symptoms, neurological symptoms, psychiatric symptoms, endocrine symptoms, hematologic/lymphatic symptoms, and allergic/ immunologic symptoms.   The pertinent factors have been included in the HPI and below.  Patient Supplied Answers to Review of Systems  No flowsheet data found.    Physical Examination   The patient underwent a physical examination as described below. The pertinent positive and negative findings are summarized after the description of the examination.  Constitutional: The patient's developmental and nutritional status was assessed. The patient's voice quality was assessed.  Head and Face: The head and face were inspected for deformities. The sinuses were palpated. The salivary glands were palpated. Facial muscle strength was assessed bilaterally.  Eyes: Extraocular movements and primary gaze alignment were assessed.  Ears, Nose, Mouth and Throat: The ears and nose were examined for deformities. The nasal septum, mucosa, and turbinates were inspected by anterior rhinoscopy. The lips, teeth, and gums were examined for abnormalities. The oral mucosa, tongue, palate, tonsils, lateral and posterior pharynx were inspected for the presence of asymmetry or mucosal lesions.    Neck: The tracheal position was noted, and the neck mass palpated to determine if there were any asymmetries, abnormal neck masses, thyromegally, or  thyroid nodules.  Respiratory: The nature of the breathing and chest expansion/symmetry was observed.  Cardiovascular: The patient was examined to determine the presence of any edema or jugular venous distension.  Abdomen: The contour of the abdomen was noted.  Lymphatic: The patient was examined for infraclavicular lymphadenopathy.  Musculoskeletal: The patient was inspected for the presence of skeletal deformities.  Extremities: The extremities were examined for any clubbing or cyanosis.  Skin: The skin was examined for inflammatory or neoplastic conditions.  Neurologic: The patient's orientation, mood, and affect were noted. The cranial nerve  functions were examined.  Other pertinent positive and negative findings on physical examination:      OC/OP: no lesions, uvula midline, soft palate elevates symmetrically   Neck: no lesions, no TH tenderness to palpation     All other physical examination findings were within normal limits and noncontributory.  Procedures     Flexible laryngoscopy (CPT 06858)      Pre-procedure diagnosis: chronic cough  Post-procedure diagnosis: same as above  Indication for procedure: Ms. Foreman is a 57 year old female with see above  Procedure(s): Fiberoptic Laryngoscopy    Details of Procedure: After informed consent was obtained, the patient was seated in the examination chair.  The areas of the nasopharynx as well as the hypopharynx were anesthetized with topical 4% lidocaine with 0.25% phenylephrine atomizer.  Examination of the base of tongue was performed first.  Attention was directed to any evidence of masses in the area or evidence of leukoplakia or candidal infection.  Attention was directed to the epiglottis where its size and position was determined and its movement on phonation of the vowel  e .  The piriform sinuses were then inspected for any mass lesions or pooling of secretions.  Attention was then directed to the larynx. The vocal folds were inspected for infection or  any areas of leukoplakia, for masses, polypoid degeneration, or hemorrhage.  Having done this, the arytenoids and vocal processes were inspected for erythema or evidence of granuloma formation.  The posterior commissure was then inspected for evidence of inflammatory changes in the mucosa and heaping up of mucosal tissue. The patient was then instructed to say the vowel  e .  Adduction of vocal folds to the midline was observed for any evidence of paresis or paralysis of the larynx or asymmetry in rotation of the larynx to the left or right. The patient was asked to breathe and the degree of abduction was noted bilaterally.  Subglottic view of the larynx was obtained for any additional mass lesions or mucosal changes.  Finally the post cricoid was examined for evidence of pooling of secretions, as well as the pharyngeal wall mucosa.   Anesthesia type: 0.25% phenylephrine    Findings:  Anatomic/physiological deviations: LNC, postcricoid edema, vocal fold with mild erythema with question of fungal laryngitis    Right vocal process: No restriction of mobility   Left vocal process: No restriction of mobility  Glottal gap: Complete glottal closure  Supraglottic structures: Normal  Hypopharynx: Normal     Estimated Blood Loss: minimal  Complications: None  Disposition: Patient tolerated the procedure well                      Fiberoptic Endoscopic Evaluation of Swallowing (CPT 43407)  and Interpretation of Swallowing (CPT 27304)    Indications: See above notes for complete history and physical. Patient complains of dysphagia to both solids and liquids and/or there is suggestion on history and endoscopic exam of the presence of dysphagia causing medical complaints.  Swallowing evaluation is being performed to assess the presence and degree of dysphagia, and to recommend a safe diet.     Pulmonary Status:  No PNA   Current Diet:              regular                                        thin liquids      Consistency  Amounts:  Thin Liquid: sip   Puree: 1 tsp  Solid: cookies            Positioning: upright in a chair  Oral Peripheral Exam: See physical exam section.  Anatomic Notes: See Videostroboscopy report for assessment of anatomy and laryngeal functioning  Pharyngeal secretions prior to administration of liquid or food: No  Oral Phase Abnormal Findings: No abnormal behavior observed  Behavioral Adaptations: No abnormal behavior observed  Pharyngeal Phase Abnormal Findings: no penetration, no aspiration    Recommended Diet:  regular                                        thin liquids             Review of Relevant Clinical Data   I personally reviewed:  Notes:   Pulmonology 4/26/22  Chronic cough, unchanged with and without high-dose ICS/LABA inhaler. At this point we have no evidence that there is an asthmatic component to her symptoms.   #. GERD, currently on PPI twice daily, but without any change to her cough.      Tessalon TID PRN -- Rx sent, can be refilled by her PCP in the future    ENT referral to the [facility]  RTC: PRN. If patient has no success w/ Tessalon & ENT, she is willing to consider a CT chest (non-contrast). I am concerned that it will be low-yield. If she is interested in doing this, I would request that her PCP order the study & notify either myself or Dr. Tyler when it is done -- we would be happy to review & offer any pulmonary guidance, if applicable. Otherwise, patient is safe to discharge from our clinic.     Radiology:   XR Chest 3/15/22  IMPRESSION: Normal cardiac and mediastinal contours. The lungs are symmetrically inflated and are clear.  Upper abdomen is unremarkable.     Procedures:   PFT 3/10/22   IMPRESSION:   Normal pulmonary function.     Labs:  No results found for: TSH  No results found for: NA, CO2, BUN, CREAT, GLUCOSE, PHOS  Lab Results   Component Value Date    HGB 14.5 03/10/2022     No results found for: PT, PTT, INR  No results found for: HARVEY  No components found  for: RHEUMATOIDFACTOR,  RF  No results found for: CRP  No components found for: CKTOT, URICACID  No components found for: C3, C4, DSDNAAB, NDNAABIFA  No results found for: MPOAB    Patient reported Quality of Life (QOL) Measures   Patient Supplied Answers To VHI Questionnaire  No flowsheet data found.      Patient Supplied Answers To EAT Questionnaire  No flowsheet data found.      Patient Supplied Answers To CSI Questionnaire  No flowsheet data found.         Impression & Plan     IMPRESSION: Ms. Foreman is a 57 year old female who is being seen for the followin. Chronic cough  - started in  in the setting of blood pressure medication   - never used cigarettes  - chest xray  - clear  - on losartan  - allergy triggers and work up: denies  - pulmonary triggers and work up: PFTs 3/10/22 - normal, referred by Dr Garcia, tried inhalers without benefit  - GI triggers and work up: does feel reflux, was told she might have an ulcer but she does not have an EGD, was given reflux meds by her primary, she can't sleep at night due to cough, cough is worse in the morning   - ENT triggers and work up: worse with talking  - scope shows post cricoid ededma, vocal fold with mild erythema with question of fungal laryngitis  - FEES shows safe swallow   - symptoms likely due to ARB BP medication, reflux etiology and irritable larynx syndrome, also has vocal fold erythema so will treat for possible bacterial or fungal laryngitis given history of steroid   - will treat for reflux and obtain esophageal work up   - needs to come off ARB since it can contribute to her cough  Plan  - referral for Dr. Solo Bautista for EGD, pH testing and manometry   - discontinue losartan, no ARB or ACE-I type blood pressure medications - will forward this request to her PCP  - will report name, dosing, and frequency of reflux medication - need to confirm BID PPI dosing  - if not on this - she will need omeprazole 40mg BID prescribed  - will  prescribe Augmentin, nystatin, and fluconazole       RETURN VISIT: August 23rd at 2 PM    Scribe Disclosure:  I, Debora Reiddona am serving as a scribe to document services personally performed by Evelina Rodriguez MD at this visit, based upon the provider's statements to me. All documentation has been reviewed by the aforementioned provider prior to being entered into the official medical record.       Thank you for the kind referral and for allowing me to share in the care of Ms. Foreman. If you have any questions, please do not hesitate to contact me.    Evelina Rodriguez MD    Laryngology    [facility]  Department of  Otolaryngology - Head and Neck Surgery  Clinics & Surgery Center  [address] [place] [address]  Appointment line: [phone]  Fax: [phone]  [email or web address]

## 2022-08-04 NOTE — PATIENT INSTRUCTIONS
You were seen in the ENT Clinic today by Dr. Rodriguez. If you have any questions or concerns after your appointment, please contact us (see below)      2.   Please return to clinic 8/23/22 with Dr. Rodriguez at 2:00 PM    REFLUX INSTRUCTIONS;    3 Odalis RN, will call you later today. Please have your medication box for reflux available.     Name of medication     Dose of medication     Schedule of medication (how often taking)  4 Plan to possibly change the reflux medication.    5 Endoscopy to look inside esophagus by Dr. Solo Bautista. His Nurse will call you in a few days to schedule.    COUGH INSTRUCTIONS    6. Procedure to check amount of reflux and see if it occurs with the cough.    7.Dr. Rodriguez will send a note to Dr. Lee to recommend to discontinue Losartan. As it is affecting tour cough.    8.Please call Dr. Lee on Monday regarding changing the medication.    9.Start medications to help with a small infection of the vocal folds.     Augmentin    Nystatin    Fluconazole    10. Speech therapy with [facility]  First 2 appointments are 8/9/22 at 2:00 PM and 8/16/22 at 2:00 PM        How to Contact Us:  Send a [employer] message to your provider. Our team will respond to you via [employer]. Occasionally, we will need to call you to get further information.  For urgent matters (Monday-Friday), call the ENT Clinic: [phone] and speak with a call center team member - they will route your call appropriately.   If you'd like to speak directly with a nurse, please find our contact information below. We do our best to check voicemail frequently throughout the day, and will work to call you back within 1-2 days. For urgent matters, please use the general clinic phone numbers listed above.      PAM Jacobo  ENT RN Care Coordinator        Rosey SMYTH LPN  Direct: [phone]

## 2022-08-04 NOTE — PROGRESS NOTES
Speech-Language Pathology Department   [facility]  Clinical swallow evaluation with endoscopic visualization provided by MD      08/04/22 1000       Present Yes   Language Analiliaong   Comments Nanda Francis   General Information   Type Of Visit Initial   Start Of Care Date 08/04/22   Referring Physician Dr. Evelina Rodriguez   Orders Evaluate And Treat   Orders Comment Clinical Swallow Evaluation   Medical Diagnosis Chronic cough   Precautions/limitations No Known Precautions/limitations   Hearing Adequate in quiet setting   Pertinent History of Current Problem/OT: Additional Occupational Profile Info Tucker Foreman is a 57-year-old woman who presented with chronic cough.  This cough started after she took her blood pressure medicine in 1999.  She requested a change in blood pressure medicine and was initially refused by her doctor.  Eventually they did change to the blood pressure medicine but her cough did not go away.  This cough is so irritating that occasionally it causes her to lose urine.  Sometimes she has a soft wet cough and other times she has a very aggressive cough.  She denies any trouble swallowing.  She denies reflux although is on some medication for a possible ulcer per her report.  She was referred to GI by her primary doctor.  She does not note any increased cough with p.o. intake.  She denies recent upper respiratory infections.  She was seen today in conjunction with ENT clinic visit at the request of Dr. Rodriguez.   Respiratory Status Room air   Prior Level Of Function Swallowing   Prior Level Of Function Comment Regular solids and thin liquids   Home/community Accessibility Comments (flowsheet Row) Independent   Clinical Swallow Evaluation   Oral Musculature generally intact   Dentition present and adequate   Mucosal Quality good   Mandibular Strength and Mobility intact   Oral Labial Strength and Mobility WFL   Lingual Strength  and Mobility WFL   Velar Elevation intact   Buccal Strength and Mobility intact   Laryngeal Function Cough;Throat clear;Swallow;Voicing initiated   Oral Musculature Comments Pt demonstrates cough throughout evaluation   Clinical Swallow Eval: Thin Liquid Texture Trial   Mode of Presentation, Thin Liquids cup;straw;self-fed   Volume of Liquid or Food Presented 3 ounces of water, several straw sips slightly tinged blue milk   Oral Phase of Swallow WFL   Pharyngeal Phase of Swallow intact   Diagnostic Statement No aspiration/penetration noted on thin liquid trials.  No pharyngeal residue after the completed swallow.   Clinical Swallow Eval: Puree Solid Texture Trial   Mode of Presentation, Puree spoon;fed by clinician   Volume of Puree Presented 2 teaspoons blue-tinged pudding   Oral Phase, Puree WFL   Pharyngeal Phase, Puree intact   Diagnostic Statement No aspiration/penetration noted on purée consistency trials.  Timely swallow noted.  No pharyngeal residue noted after the completed swallow.   Clinical Swallow Eval: Regular (Solid)   Mode of Presentation self-fed   Volume Presented 1 Jadyn Doone cookie   Oral Phase WFL   Pharyngeal Phase intact   Diagnostic Statement No aspiration/penetration noted on cookie consistency trials.  No pharyngeal residue after the completed swallow.   Swallow Eval: Clinical Impressions   Skilled Criteria for Therapy Intervention Current level of function same as previous level of function   Functional Assessment Scale (FAS) 7   Dysphagia Outcome Severity Scale (SHAYAN) Level 7 - SHAYAN   Diet texture recommendations Regular diet;Thin liquids (level 0)   Recommended Feeding/Eating Techniques maintain upright posture during/after eating for 30 mins   Predicted Duration of Therapy Intervention (days/wks) Evaluation only   Risks and Benefits of Treatment have been explained. Yes   Patient, family and/or staff in agreement with Plan of Care Yes   Clinical Impression Comments Tucker Foreman  demonstrates a functional oropharyngeal swallow as noted with endoscopic view provided by MD.  No aspiration/penetration noted on any consistencies presented during today's exam.  Timely swallow on all trials.  No pharyngeal residue after the completed swallow.  Adequate oral manipulation of all consistencies.  She did demonstrate burping throughout the exam but was not aware of these episodes.  She does have a notable reflux history.  Functional ROM and strength of oral mechanism demonstrated.  Recommend she continue with regular consistency solids and thin liquids.  She was encouraged to sit upright during all p.o. intake.  She will likely benefit from further GI work-up due to burping and reflux symptoms.  No further speech pathology services indicated for swallowing at this time.   Total Session Time   SLP Eval: oral/pharyngeal swallow function, clinical minutes (53652) 16   Total Evaluation Time 16     (Chart documentation was completed in part with Dragon voice-recognition software. Even though reviewed, some grammatical, spelling, and word errors may remain.)    Thank you for the referral of Tucker Foreman. If you have any questions about this report, please contact me using the information below.      Rosalba German MS, CCC-SLP  Speech-Language Pathology  [facility] [place]  Department of Otolaryngology/D&T - 4th floor  Pager: [phone]  Phone: [phone]  Email: [email or web address]     Declined

## 2022-08-04 NOTE — PROGRESS NOTES
[facility]  Clinical Voice and Upper Airway Evaluation Report    Patient's Name: Tucker Foreman  Date of Evaluation: 8/4/22  Providing SLP: Raine Nicole MS CCC-SLP  Seen in Conjunction With: Evelina Rodriguez MD - Rosalba German CCC-SLP  Referring Provider and Facility: Ila Garcia MD - Pulmonology  Chief Complaint: chronic cough  Assessment / Treatment Time: 1:15  Evaluation Location: [facility]  Others in Attendance for the Evaluation: jess , May- her       Patient History: Tucker is a 57-year-old female who presents today for evaluation of cough.     Cough / Throat Clearing:     Onset:    Began taking ACE inhibitor in 1999 when she moved to [place] from [place]    She stayed on this medication for a number of years to control her blood pressure, stating that her PCP did not want to switch it    More recently, she switched to another blood pressure medication; however, she was switched back to Losartan in May 2022 as her blood pressure was in the 200s. It is now WNL on Losartan    Progression: stable. She denies any changes to her cough frequency going on and off different blood pressure medications    Other treatments:    No help from OTC cough medicines    Recent prescribed baby aspirin and a cholesterol medication    Is currently taking unknown dosage of a reflux medication due to suspected gastric ulcers. She has never seen GI or had an EGD    Pulmonary workup WNL. No change with inhaled treatments and subsequently no longer taking these    Cough details:    Can be both chronic (e.g. every couple of minutes) or episodic where she cannot stop coughing    Mostly productive and not frequently dry    Wakes up in the middle of the night coughing and sleeps very little because of her coughing    Triggers: worse in the mornings, smoke, food odors (e.g. in a restaurant), talking, exertion, dish soap smell, pressure to the epigastric region,  sitting    Paroxysmal coughing can cause urinary incontinence, fatigue, and chest pain    Endorses feeling a throat tickle that triggers coughing    Eleviating factors: standing, sipping water    Dysphonia: notes that her voice is deeper and scratchier when she is coughing more    Dyspnea: denies    Dysphagia: denies coughing when eating or drink, as well as any swallowing difficulties    Medical / Surgical History: HTN, possible gastric ulcer  Other Medical Evaluations, Testing, and Treatments: reflux medications (details unknown), losartan   Cognitive Status / Ability to Comprehend Directions: appropriate - communicated via Hmong   Barriers to Progress: unknown GI status, hx of ACE inhibitor use      Quality of Life Questionnaires: not completed      Perceptual Analysis (84825): Evaluation of Voice / Speech / Non-Communicative Laryngeal Behaviors    The GRBAS is a perceptual rating of voice change. 0 indicated no impairment, 3 indicates a severe impairment. This is a rating based on clinical judgement of disordered voice quality.  G ( 1 ) General Dysphonia     R ( 0 ) Roughness     B ( 0 ) Breathiness     A ( 0 ) Asthenia     S ( 1 ) Strain    Laryngeal palpation: endorses that applying pressure to the sternal notch can resolve coughing to an extent    Additional observations:     Coughing / throat clearing: frequent coughing noted during the session. It is primarily wet and productive. Her last cough typically sounds dry prior to ending her coughing episode    Breathing patterns: WNL    Overt tension: WNL    Habitual pitch: WNL    Resonance: appropriate    Loudness: appropriate      Laryngoscopy (96362 without stroboscopy):    Provider performing exam: Evelina Rodriguez MD    Informed consent: Informed consent was obtained, which includes potential side-effects, risks, and benefits of the procedure.    Anesthetic: Topical anesthesia with 0.25% phenylephrine was applied the nostrils bilaterally. Viscous  lidocaine 4% was applied to the tip of the endoscopy.    Scope type: A distal chip flexible laryngoscope was passed through the nare with halogen light source.    The laryngeal and pharyngeal structures were evaluated for gross appearance, mobility, function, and focal lesions / abnormalities of the associated mucosa.  All findings were within normal limits with the exception of the following salient features:   Appearance: post-cricoid edema and cobblestoning of the posterior pharyngeal wall with interarytenoid pachydermia, consistent with laryngeal irritation secondary to laryngopharyngeal reflux. Intermittent opening of the UES at rest, as if burping. Tucker denied that she was burping.  Vocal Fold Edges: mild diffuse erythema of the superior surface with very small white spots on the superior surface bilaterally, consistent with the appearance of laryngeal candidiasis    FEES: Evaluation was performed by Dr. Rodriguez and Rosalba German, CCC-SLP. Findings demonstrate no aspiration.       Impressions and Plan:     Tucker is a 57-year-old female with chronic cough (R05.9) and subsequent dysphonia (R49.0) beginning in 1999 following ACE inhibitor use and irritable larynx syndrome (J38.7). Perceptually, her voice is mildly strained, and she has frequent 1-2 coughs that sound productive, as well as paroxysmal coughing not observed during today's session. Under laryngoscopy, her larynx demonstrated signs of laryngopharyngeal reflux (e.g. 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 Dr. Rodriguez's recommendations are as follows:    Adjust GERD medications as needed    Schedule EGD    Communicate with PCP Dr. Lee to switch her blood pressure medications    Treat laryngeal candidiasis with nystatin, augmentin, and fluconazole    Recommend voice therapy for cough suppression     Tucker and  her  are in agreement with this plan of care and endorse that their son at home is a proficient English speaker and is able to help them navigate appointments and instructions       Goals:    Chronic Cough    Decrease cough in all activities of daily living using compensation strategies    Independently implement a cough suppression strategy when cough trigger is sensed 90% of the time.?     Decrease the number of coughs per day by 50%     Demonstrate increased tolerance of a chemical and/or environmental irritant as evidenced by increased exposure (decreased proximity and/or increased strength) to that irritant by 50%     Demonstrate a 50% reduction in Cough Severity Index score     Voice    Coordinate phonatory and respiratory subsystems, demonstrating adequate independence for activities of daily communication     Decrease extrinsic laryngeal muscle activity during communication events     Improve voice quality in all activities of daily living using, as measured by a minimum of a 50% point reduction on the Voice Handicap Index-10 or Singing VHI    Demonstrate appropriate vocal hygiene including, but not limited to, adequate hydration, avoiding vocal abuse, integrating behavioral and dietary changes for GERD into their daily life?.     Incorporate behaviors to reduce irritation and promote laryngeal healing and prevent recurrence.?     Implement behavioral strategies to reduce laryngopharyngeal reflux. ?     Independently maintain a forward focus voice placement 90% of the time during conversational speech.    Independently perform the Vocal Function Exercises, rebalancing respiration, phonation, and resonance 90% of the time    Perform High Level Phonation and Pitch Range Navigation Exercises independently 90% of the time      Billed Procedures:    No charge facility fee    Perceptual voice assessment 84358      Thank you for allowing me to participate in this patient's care,    Raine Nicole MS  CCC-SLP  Speech-Language Pathologist  [facility]  [email or web address]  [phone]

## 2022-08-04 NOTE — LETTER
8/4/2022       RE: Tucker Foreman  [address]  [place] [address]     Dear Colleague,    Thank you for referring your patient, Tucker Foreman, to the [facility]. Please see a copy of my visit note below.    [facility]  Clinical Voice and Upper Airway Evaluation Report    Patient's Name: Tucker Foreman  Date of Evaluation: 8/4/00  Providing SLP: Raine Nicole MS CCC-SLP  Seen in Conjunction With: Evelina Rodriguez MD - Rosalba German CCC-SLP  Referring Provider and Facility: Ila Garcia MD - Pulmonology  Chief Complaint: chronic cough  Assessment / Treatment Time: 1:15  Evaluation Location: [facility]  Others in Attendance for the Evaluation: Elissa , May- her       Patient History: Tucker is a 57-year-old female who presents today for evaluation of cough.     Cough / Throat Clearing:     Onset:    Began taking ACE inhibitor in 1999 when she moved to [place] from [place]    She stayed on this medication for a number of years to control her blood pressure, stating that her PCP did not want to switch it    More recently, she switched to another blood pressure medication; however, she was switched back to Losartan in May 2022 as her blood pressure was in the 200s. It is now WNL on Losartan    Progression: stable. She denies any changes to her cough frequency going on and off different blood pressure medications    Other treatments:    No help from OTC cough medicines    Recent prescribed baby aspirin and a cholesterol medication    Is currently taking unknown dosage of a reflux medication due to suspected gastric ulcers. She has never seen GI or had an EGD    Pulmonary workup WNL. No change with inhaled treatments and subsequently no longer taking these    Cough details:    Can be both chronic (e.g. every couple of minutes) or episodic where she cannot stop  coughing    Mostly productive and not frequently dry    Wakes up in the middle of the night coughing and sleeps very little because of her coughing    Triggers: worse in the mornings, smoke, food odors (e.g. in a restaurant), talking, exertion, dish soap smell, pressure to the epigastric region, sitting    Paroxysmal coughing can cause urinary incontinence, fatigue, and chest pain    Endorses feeling a throat tickle that triggers coughing    Eleviating factors: standing, sipping water    Dysphonia: notes that her voice is deeper and scratchier when she is coughing more    Dyspnea: denies    Dysphagia: denies coughing when eating or drink, as well as any swallowing difficulties    Medical / Surgical History: HTN, possible gastric ulcer  Other Medical Evaluations, Testing, and Treatments: reflux medications (details unknown), losartan   Cognitive Status / Ability to Comprehend Directions: appropriate - communicated via O[employer]i   Barriers to Progress: unknown GI status, hx of ACE inhibitor use      Quality of Life Questionnaires: not completed      Perceptual Analysis (03343): Evaluation of Voice / Speech / Non-Communicative Laryngeal Behaviors    The GRBAS is a perceptual rating of voice change. 0 indicated no impairment, 3 indicates a severe impairment. This is a rating based on clinical judgement of disordered voice quality.  G ( 1 ) General Dysphonia     R ( 0 ) Roughness     B ( 0 ) Breathiness     A ( 0 ) Asthenia     S ( 1 ) Strain    Laryngeal palpation: endorses that applying pressure to the sternal notch can resolve coughing to an extent    Additional observations:     Coughing / throat clearing: frequent coughing noted during the session. It is primarily wet and productive. Her last cough typically sounds dry prior to ending her coughing episode    Breathing patterns: WNL    Overt tension: WNL    Habitual pitch: WNL    Resonance: appropriate    Loudness: appropriate      Laryngoscopy (80179 without  stroboscopy):    Provider performing exam: Evelina Rodriguez MD    Informed consent: Informed consent was obtained, which includes potential side-effects, risks, and benefits of the procedure.    Anesthetic: Topical anesthesia with 0.25% phenylephrine was applied the nostrils bilaterally. Viscous lidocaine 4% was applied to the tip of the endoscopy.    Scope type: A distal chip flexible laryngoscope was passed through the nare with halogen light source.    The laryngeal and pharyngeal structures were evaluated for gross appearance, mobility, function, and focal lesions / abnormalities of the associated mucosa.  All findings were within normal limits with the exception of the following salient features:   Appearance: post-cricoid edema and cobblestoning of the posterior pharyngeal wall with interarytenoid pachydermia, consistent with laryngeal irritation secondary to laryngopharyngeal reflux. Intermittent opening of the UES at rest, as if burping. Tucker denied that she was burping.  Vocal Fold Edges: mild diffuse erythema of the superior surface with very small white spots on the superior surface bilaterally, consistent with the appearance of laryngeal candidiasis    FEES: Evaluation was performed by Dr. Rodriguez and Rosalba German, CCC-SLP. Findings demonstrate no aspiration.       Impressions and Plan:     Tucker is a 57-year-old female with chronic cough (R05.9) and subsequent dysphonia (R49.0) beginning in 1999 following ACE inhibitor use and irritable larynx syndrome (J38.7). Perceptually, her voice is mildly strained, and she has frequent 1-2 coughs that sound productive, as well as paroxysmal coughing not observed during today's session. Under laryngoscopy, her larynx demonstrated signs of laryngopharyngeal reflux (e.g. 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 Dr. Rodriguez's  recommendations are as follows:    Adjust GERD medications as needed    Schedule EGD    Communicate with PCP Dr. Lee to switch her blood pressure medications    Treat laryngeal candidiasis with nystatin, augmentin, and fluconazole    Recommend voice therapy for cough suppression     Tucker and her  are in agreement with this plan of care and endorse that their son at home is a proficient English speaker and is able to help them navigate appointments and instructions       Goals:    Chronic Cough    Decrease cough in all activities of daily living using compensation strategies    Independently implement a cough suppression strategy when cough trigger is sensed 90% of the time.?     Decrease the number of coughs per day by 50%     Demonstrate increased tolerance of a chemical and/or environmental irritant as evidenced by increased exposure (decreased proximity and/or increased strength) to that irritant by 50%     Demonstrate a 50% reduction in Cough Severity Index score     Voice    Coordinate phonatory and respiratory subsystems, demonstrating adequate independence for activities of daily communication     Decrease extrinsic laryngeal muscle activity during communication events     Improve voice quality in all activities of daily living using, as measured by a minimum of a 50% point reduction on the Voice Handicap Index-10 or Singing VHI    Demonstrate appropriate vocal hygiene including, but not limited to, adequate hydration, avoiding vocal abuse, integrating behavioral and dietary changes for GERD into their daily life?.     Incorporate behaviors to reduce irritation and promote laryngeal healing and prevent recurrence.?     Implement behavioral strategies to reduce laryngopharyngeal reflux. ?     Independently maintain a forward focus voice placement 90% of the time during conversational speech.    Independently perform the Vocal Function Exercises, rebalancing respiration, phonation, and resonance 90% of  the time    Perform High Level Phonation and Pitch Range Navigation Exercises independently 90% of the time      Billed Procedures:    No charge facility fee    Perceptual voice assessment 13359      Thank you for allowing me to participate in this patient's care,    Raine Nicole MS CCC-SLP  Speech-Language Pathologist  [facility]  [email or web address]  [phone]

## 2022-08-09 ENCOUNTER — VIRTUAL VISIT (OUTPATIENT)
Dept: OTOLARYNGOLOGY | Facility: CLINIC | Age: 57
End: 2022-08-09
Payer: COMMERCIAL

## 2022-08-09 DIAGNOSIS — R05.3 CHRONIC COUGH: Primary | ICD-10-CM

## 2022-08-09 DIAGNOSIS — R49.0 DYSPHONIA: ICD-10-CM

## 2022-08-09 DIAGNOSIS — J38.7 IRRITABLE LARYNX SYNDROME: ICD-10-CM

## 2022-08-09 PROCEDURE — 92507 TX SP LANG VOICE COMM INDIV: CPT | Mod: GN | Performed by: SPEECH-LANGUAGE PATHOLOGIST

## 2022-08-09 NOTE — LETTER
8/9/2022       RE: Tucker Foreman  [address]  [place] [address]     Dear Colleague,    Thank you for referring your patient, Tucker Foreman, to the [facility] [place] [facility]. Please see a copy of my visit note below.    [facility]  VOICE / UPPER AIRWAY TREATMENT NOTE (CPT 02111)      Patient's name: Tucker Foreman  Date of Session: 8/9/2022  Providing SLP: Raine Nicole MS CCC-SLP  Referring Provider: Ila Garcia MD - Pulmonology; Evelina Rodriguez MD - Otolaryngology  Session #: 1  Session Location: Tucker was seen via telehealth today.   Additional Parties Present: her son, Dahiana Bowers , Sam Licona - #[id number]    The patient has been notified and verbally consented to the following statements:     This video visit will be conducted between you and your provider.    Patient has opted to conduct today's video visit vs an in-person appointment, and is not able to attend due to possible exposure to COVID-19.      If during the course of the call the provider feels a video visit is not appropriate, you will not be charged for this service.     Provider has received verbal consent for billable virtual visit from the patient? Yes  Preferred method for receiving information: [employer]  Call initiated at: 2:10 PM  Call ended at: 2:46 PM  Platform used to conduct today's virtual appointment: [employer]  Location of provider: Residence  Location of patient: Residence       Impressions from most recent evaluation on 8/4/22 with Raine Nicole MS CCC-SLP: Tucker is a 57-year-old female with chronic cough (R05.9) and subsequent dysphonia (R49.0) beginning in 1999 following ACE inhibitor use and irritable larynx syndrome (J38.7). Perceptually, her voice is mildly strained, and she has frequent 1-2 coughs that sound productive, as well as paroxysmal coughing not observed during today's session. Under laryngoscopy, her larynx demonstrated signs  of laryngopharyngeal reflux (e.g. 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 Dr. Rodriguez's recommendations are as follows:    Adjust GERD medications as needed    Schedule EGD    Communicate with PCP Dr. Lee to switch her blood pressure medications    Treat laryngeal candidiasis with nystatin, augmentin, and fluconazole    Recommend voice therapy for cough suppression        SUBJECTIVE:  Tucker reports her coughing has improved since starting the antibiotic and antifungal medications. She reports that it is still productive but not as much as prior. We discussed her progress in terms of scheduling the above evaluations and communications:    Adjust GERD medications: is taking 40 mg pantoprazole BID. She is unsure if this needs to be adjusted. I will contact Dr. Rodriguez to see    Schedule EGD: has not been done    Communicate with Dr. Lee about BP medication: Dr. Rodriguez's notes have been faxed over, but Tucker has not heard from their office yet. I plan to call them to draw their attention to the matter.    Antibiotics and antifungal medications: in progress and helping    Voice therapy for cough suppression: in progress       OBJECTIVE/ASSESSMENT:    PATIENT REPORTED MEASURES:  Dysponia SLP Goals 8/9/2022   How severe is your cough /throat clearing, if 0 is no cough at all and 10 is the worst cough? 5       THERAPEUTIC ACTIVITIES:     Cough and throat clearing reduction strategies are a behavioral treatment to replace chronic coughing/throat clearing behaviors with multiple effortful swallows, thus reducing laryngeal hypersensitivity. These exercises were instructed today, and Tucker performed them with high accuracy following clinician cueing and modeling. We discussed effortful swallows with water, as well as gentle throat clears and strong airflow without vocal fold adduction to  clear mucus without phonotrauma. She endorsed understanding of these procedures.      IMPRESSIONS:    Chronic cough (R05.9) and subsequent dysphonia (R49.0) beginning in 1999 following ACE inhibitor use and irritable larynx syndrome (J38.7)    Tucker reports improvements in her coughing and productivity with antibiotic and antifungal treatments she started last week. Dr. Rodriguez's recommendations for follow-ups and medication changes are in progress, and I plan to help facilitate this as I am able. Tucker endorsed understanding of the cough reduction strategies. Her son was helpful in today's session with communication and help with technology (in the future, remember to use his number with [employer] - [phone]).          PLAN:    Tucker will perform cough suppression techniques PRN between sessions     We will continue to schedule/communicate with her different doctors to adjust medications and schedule procedures as recommended by Dr. Rodriguez    Written instructions were taken down by Tucker's son. Tucker does not read or write in English or Hmong. Her son was able to write the instructions down in English and plans to relay them to her verbally in Hmong as she requests.    Tucker continues to demonstrate adequate progress toward long- and short-term goals.    Continued voice therapy services are recommended. Tuckre will follow-up for additional sessions in 1 week. Current goals will continue to be addressed.    Tucker is in agreement with this plan of care.      BILLING SUMMARY:    Total treatment time: 36 minutes    Speech Pathology Treatment (70632)    No charge facility fee ([id number])      Raine Nicole MS CCC-SLP  Speech-Language Pathologist  [facility]  [email or web address]  [phone]

## 2022-08-09 NOTE — PROGRESS NOTES
[facility] VOICE CLINIC  VOICE / UPPER AIRWAY TREATMENT NOTE (CPT 98836)      Patient's name: Tucker Foreman  Date of Session: 8/9/2022  Providing SLP: Raine Nicole MS CCC-SLP  Referring Provider: Ila Garcia MD - Pulmonology; Evelina Rodriguez MD - Otolaryngology  Session #: 1  Session Location: Tucker was seen via telehealth today.   Additional Parties Present: her son, Dahiana Bowers , Sma Licona - #[id number]    The patient has been notified and verbally consented to the following statements:     This video visit will be conducted between you and your provider.    Patient has opted to conduct today's video visit vs an in-person appointment, and is not able to attend due to possible exposure to COVID-19.      If during the course of the call the provider feels a video visit is not appropriate, you will not be charged for this service.     Provider has received verbal consent for billable virtual visit from the patient? Yes  Preferred method for receiving information: [employer]  Call initiated at: 2:10 PM  Call ended at: 2:46 PM  Platform used to conduct today's virtual appointment: [employer]  Location of provider: Residence  Location of patient: Residence       Impressions from most recent evaluation on 8/4/22 with Raine Nicole MS CCC-SLP: Tucker is a 57-year-old female with chronic cough (R05.9) and subsequent dysphonia (R49.0) beginning in 1999 following ACE inhibitor use and irritable larynx syndrome (J38.7). Perceptually, her voice is mildly strained, and she has frequent 1-2 coughs that sound productive, as well as paroxysmal coughing not observed during today's session. Under laryngoscopy, her larynx demonstrated signs of laryngopharyngeal reflux (e.g. 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 Dr. Rodriguez's recommendations are as follows:    Adjust GERD medications as  needed    Schedule EGD    Communicate with PCP Dr. Lee to switch her blood pressure medications    Treat laryngeal candidiasis with nystatin, augmentin, and fluconazole    Recommend voice therapy for cough suppression        SUBJECTIVE:  Tucker reports her coughing has improved since starting the antibiotic and antifungal medications. She reports that it is still productive but not as much as prior. We discussed her progress in terms of scheduling the above evaluations and communications:    Adjust GERD medications: is taking 40 mg pantoprazole BID. She is unsure if this needs to be adjusted. I will contact Dr. Rodriguez to see    Schedule EGD: has not been done    Communicate with Dr. Lee about BP medication: Dr. Rodriguez's notes have been faxed over, but Tucker has not heard from their office yet. I plan to call them to draw their attention to the matter.    Antibiotics and antifungal medications: in progress and helping    Voice therapy for cough suppression: in progress       OBJECTIVE/ASSESSMENT:    PATIENT REPORTED MEASURES:  Dysponia SLP Goals 8/9/2022   How severe is your cough /throat clearing, if 0 is no cough at all and 10 is the worst cough? 5       THERAPEUTIC ACTIVITIES:     Cough and throat clearing reduction strategies are a behavioral treatment to replace chronic coughing/throat clearing behaviors with multiple effortful swallows, thus reducing laryngeal hypersensitivity. These exercises were instructed today, and Tucker performed them with high accuracy following clinician cueing and modeling. We discussed effortful swallows with water, as well as gentle throat clears and strong airflow without vocal fold adduction to clear mucus without phonotrauma. She endorsed understanding of these procedures.      IMPRESSIONS:    Chronic cough (R05.9) and subsequent dysphonia (R49.0) beginning in 1999 following ACE inhibitor use and irritable larynx syndrome (J38.7)    Tucker reports improvements in her coughing and  productivity with antibiotic and antifungal treatments she started last week. Dr. Rodriguez's recommendations for follow-ups and medication changes are in progress, and I plan to help facilitate this as I am able. Tucker endorsed understanding of the cough reduction strategies. Her son was helpful in today's session with communication and help with technology (in the future, remember to use his number with [employer] - [phone]).          PLAN:    Tucker will perform cough suppression techniques PRN between sessions     We will continue to schedule/communicate with her different doctors to adjust medications and schedule procedures as recommended by Dr. Rodriguez    Written instructions were taken down by Tucker's son. Tucker does not read or write in English or Hmong. Her son was able to write the instructions down in English and plans to relay them to her verbally in Hmong as she requests.    Tucker continues to demonstrate adequate progress toward long- and short-term goals.    Continued voice therapy services are recommended. Tucker will follow-up for additional sessions in 1 week. Current goals will continue to be addressed.    Tucker is in agreement with this plan of care.      BILLING SUMMARY:    Total treatment time: 36 minutes    Speech Pathology Treatment (74003)    No charge facility fee ([id number])      Raine Nicole MS CCC-SLP  Speech-Language Pathologist  [facility]  [email or web address]  [phone]

## 2022-08-15 NOTE — PROGRESS NOTES
[facility] VOICE CLINIC  VOICE / UPPER AIRWAY TREATMENT NOTE (CPT 30273)      Patient's name: Tucker Foreman  Date of Session: 8/16/2022  Providing SLP: Raine Nicole MS CCC-SLP  Referring Provider: Ila Garcia MD - Pulmonology; Evelina Rodriguez MD - Otolaryngology  Session #: 2  Session Location: Tucker was seen via telehealth today.   Additional Parties Present: ong , Ian     The patient has been notified and verbally consented to the following statements:     This video visit will be conducted between you and your provider.    Patient has opted to conduct today's video visit vs an in-person appointment, and is not able to attend due to possible exposure to COVID-19.      If during the course of the call the provider feels a video visit is not appropriate, you will not be charged for this service.     Provider has received verbal consent for billable virtual visit from the patient? Yes  Preferred method for receiving information: [email or web address]  Call initiated at: 2:04 PM  Call ended at: 2:35 PM  Platform used to conduct today's virtual appointment[facility]  Location of provider: Residence  Location of patient: Residence       Impressions from most recent evaluation on 8/4/22 with Raine Nicole MS CCC-SLP:     Tucker is a 57-year-old female with chronic cough (R05.9) and subsequent dysphonia (R49.0) beginning in 1999 following ACE inhibitor use and irritable larynx syndrome (J38.7). Perceptually, her voice is mildly strained, and she has frequent 1-2 coughs that sound productive, as well as paroxysmal coughing not observed during today's session. Under laryngoscopy, her larynx demonstrated signs of laryngopharyngeal reflux (e.g. 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 Dr. Rodriguez's recommendations are as follows:    Adjust GERD medications as needed    Schedule  EGD    Communicate with PCP Dr. Lee to switch her blood pressure medications    Treat laryngeal candidiasis with nystatin, augmentin, and fluconazole    Recommend voice therapy for cough suppression        SUBJECTIVE:    Tucker reports significant coughing improvements, noting that there are some days where she is not coughing at all and times where she only coughs 1-2 times per day. The productivity of her coughing has reduced as well with minimal phlegm. She feels that the medications have been most helpful (she has almost finished these) and uses cough suppression strategies like sipping water when she feels an urge to cough. Dr. Lee's office called her yesterday morning and switched her blood pressure medication, but she hasn't picked it up yet. Overall, she is very happy by how much her coughing has improved over this short amount of time.       PATIENT REPORTED MEASURES:  Dysponia SLP Goals 8/9/2022 8/16/2022   How severe is your cough /throat clearing, if 0 is no cough at all and 10 is the worst cough? 5 3       THERAPEUTIC ACTIVITIES:     Cough and throat clearing reduction strategies are a behavioral treatment to replace chronic coughing/throat clearing behaviors with multiple effortful swallows, thus reducing laryngeal hypersensitivity. These exercises were instructed today, and Tucker performed them with high independent accuracy. She took sips of water throughout the session, including independently after an instance of 2-3 wet-sounding coughs during the session.      IMPRESSIONS:    Chronic cough (R05.9) and subsequent dysphonia (R49.0) beginning in 1999 following ACE inhibitor use and irritable larynx syndrome (J38.7)    Tucker reports significant improvements in her coughing and productivity with antibiotic and antifungal treatments. She will also soon be on a different blood pressure medication - she wasn't sure of the name. She also uses cough suppression strategies independently and appropriately to  suppress many of her coughs. She is very happy with how much her coughing has reduced. She has a follow-up with Dr. Rodriguez next Tuesday, and I plan to attend to assess progress as well as adjust the treatment plan and goals as needed. Tucker is in agreement with this plan of care.Dr. Rodriguez's recommendations for follow-ups and medication changes are in progress, and I plan to help facilitate this as I am able. Tucker endorsed understanding of the cough reduction strategies.          PLAN:    Tucker will perform cough suppression techniques PRN between sessions     We will continue to schedule/communicate with her different doctors to adjust medications and schedule procedures as recommended by Dr. Rordiguez    Tucker continues to demonstrate adequate progress toward long- and short-term goals.    Continued voice therapy services are recommended. Tucker will follow-up for re-evaluation with Dr. Rodriguez and I in 1 week. Current goals will continue to be addressed.    Tucker is in agreement with this plan of care.       BILLING SUMMARY:    Total treatment time: 31 minutes    Speech Pathology Treatment ([id number])    No charge facility fee ([id number])      Raine Nicole MS CCC-SLP  Speech-Language Pathologist  [facility]  [email or web address]  [phone]

## 2022-08-16 ENCOUNTER — VIRTUAL VISIT (OUTPATIENT)
Dept: OTOLARYNGOLOGY | Facility: CLINIC | Age: 57
End: 2022-08-16
Payer: COMMERCIAL

## 2022-08-16 DIAGNOSIS — J38.7 IRRITABLE LARYNX SYNDROME: ICD-10-CM

## 2022-08-16 DIAGNOSIS — R05.3 CHRONIC COUGH: Primary | ICD-10-CM

## 2022-08-16 DIAGNOSIS — R49.0 DYSPHONIA: ICD-10-CM

## 2022-08-16 PROCEDURE — 92507 TX SP LANG VOICE COMM INDIV: CPT | Mod: GN | Performed by: SPEECH-LANGUAGE PATHOLOGIST

## 2022-08-16 NOTE — LETTER
8/16/2022       RE: Tucker Foreman  [address]  [place] [address]     Dear Colleague,    Thank you for referring your patient, Tucker Foreman, to the [facility] VOICE CLINIC [place] [facility]. Please see a copy of my visit note below.    [facility]  VOICE / UPPER AIRWAY TREATMENT NOTE (CPT 07842)      Patient's name: Tucker Foreman  Date of Session: 8/16/2022  Providing SLP: Raine Nicole MS CCC-SLP  Referring Provider: Ila Garcia MD - Pulmonology; Evelina Rodriguez MD - Otolaryngology  Session #: 2  Session Location: Tucker was seen via telehealth today.   Additional Parties Present: [facility] , Ian     The patient has been notified and verbally consented to the following statements:     This video visit will be conducted between you and your provider.    Patient has opted to conduct today's video visit vs an in-person appointment, and is not able to attend due to possible exposure to COVID-19.      If during the course of the call the provider feels a video visit is not appropriate, you will not be charged for this service.     Provider has received verbal consent for billable virtual visit from the patient? Yes  Preferred method for receiving information: [employer]  Call initiated at: 2:04 PM  Call ended at: 2:35 PM  Platform used to conduct today's virtual appointment: [employer]  Location of provider: Residence  Location of patient: Residence       Impressions from most recent evaluation on 8/4/22 with Raine Nicole MS CCC-SLP:     Tucker is a 57-year-old female with chronic cough (R05.9) and subsequent dysphonia (R49.0) beginning in 1999 following ACE inhibitor use and irritable larynx syndrome (J38.7). Perceptually, her voice is mildly strained, and she has frequent 1-2 coughs that sound productive, as well as paroxysmal coughing not observed during today's session. Under laryngoscopy, her larynx demonstrated signs of laryngopharyngeal  reflux (e.g. 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 Dr. Rodriguez's recommendations are as follows:    Adjust GERD medications as needed    Schedule EGD    Communicate with PCP Dr. Lee to switch her blood pressure medications    Treat laryngeal candidiasis with nystatin, augmentin, and fluconazole    Recommend voice therapy for cough suppression        SUBJECTIVE:    Tucker reports significant coughing improvements, noting that there are some days where she is not coughing at all and times where she only coughs 1-2 times per day. The productivity of her coughing has reduced as well with minimal phlegm. She feels that the medications have been most helpful (she has almost finished these) and uses cough suppression strategies like sipping water when she feels an urge to cough. Dr. Lee's office called her yesterday morning and switched her blood pressure medication, but she hasn't picked it up yet. Overall, she is very happy by how much her coughing has improved over this short amount of time.       PATIENT REPORTED MEASURES:  Dysponia SLP Goals 8/9/2022 8/16/2022   How severe is your cough /throat clearing, if 0 is no cough at all and 10 is the worst cough? 5 3       THERAPEUTIC ACTIVITIES:     Cough and throat clearing reduction strategies are a behavioral treatment to replace chronic coughing/throat clearing behaviors with multiple effortful swallows, thus reducing laryngeal hypersensitivity. These exercises were instructed today, and Tucker performed them with high independent accuracy. She took sips of water throughout the session, including independently after an instance of 2-3 wet-sounding coughs during the session.      IMPRESSIONS:    Chronic cough (R05.9) and subsequent dysphonia (R49.0) beginning in 1999 following ACE inhibitor use and irritable larynx syndrome  (J38.7)    Tucker reports significant improvements in her coughing and productivity with antibiotic and antifungal treatments. She will also soon be on a different blood pressure medication - she wasn't sure of the name. She also uses cough suppression strategies independently and appropriately to suppress many of her coughs. She is very happy with how much her coughing has reduced. She has a follow-up with Dr. Rodriguez next Tuesday, and I plan to attend to assess progress as well as adjust the treatment plan and goals as needed. Tucker is in agreement with this plan of care.Dr. Rodriguez's recommendations for follow-ups and medication changes are in progress, and I plan to help facilitate this as I am able. Tucker endorsed understanding of the cough reduction strategies.          PLAN:    Tucker will perform cough suppression techniques PRN between sessions     We will continue to schedule/communicate with her different doctors to adjust medications and schedule procedures as recommended by Dr. Rodriguez    Tucker continues to demonstrate adequate progress toward long- and short-term goals.    Continued voice therapy services are recommended. Tucker will follow-up for re-evaluation with Dr. Rodrgiuez and I in 1 week. Current goals will continue to be addressed.    Tucker is in agreement with this plan of care.       BILLING SUMMARY:    Total treatment time: 31 minutes    Speech Pathology Treatment ([id number])    No charge facility fee ([id number])      Raine Nicole MS CCC-SLP  Speech-Language Pathologist  [facility]  [email or web address]  [phone]

## 2022-08-17 NOTE — PROGRESS NOTES
[facility]   at the [facility]   Otolaryngology Clinic     Patient: Tucker Foreman    MRN: [id number]    : 1965    Age/Gender: 57 year old female  Date of Service: 2022  Rendering Provider:   Evelina Rodriguez MD     Chief Complaint   Chronic cough  Dyspnea  Interval History   HISTORY OF PRESENT ILLNESS: Ms. Foreman is a 57 year old female is being followed for chronic cough. she was initially seen on 22. Please refer to this note for full history.     Of note she has a history of GERD on daily PPI and no history of asthma. Her preferred language is Hmong.     Today, she presents for follow up. she reports:  - started voice therapy  - took her course of Augmentin, nystatin, and fluconazole and her cough has improved greatly since  - it is not completely resolved, but a lot better  - she has been doing cough suppression therapy exercises at home    PAST MEDICAL HISTORY: No past medical history on file.    PAST SURGICAL HISTORY: No past surgical history on file.    CURRENT MEDICATIONS:   Current Outpatient Medications:      albuterol (PROAIR HFA/PROVENTIL HFA/VENTOLIN HFA) 108 (90 Base) MCG/ACT inhaler, Inhale 2 puffs into the lungs every 6 hours as needed for shortness of breath / dyspnea or wheezing, Disp: , Rfl:      amoxicillin-clavulanate (AUGMENTIN) 875-125 MG tablet, Take 1 tablet by mouth 2 times daily for 14 days, Disp: 28 tablet, Rfl: 0     benzonatate (TESSALON) 200 MG capsule, Take 1 capsule (200 mg) by mouth 3 times daily as needed for cough, Disp: 90 capsule, Rfl: 4     budesonide-formoterol (SYMBICORT) 160-4.5 MCG/ACT Inhaler, Inhale 2 puffs into the lungs 2 times daily, Disp: , Rfl:      cetirizine (ZYRTEC) 10 MG tablet, Take 10 mg by mouth daily, Disp: , Rfl:      citalopram (CELEXA) 40 MG tablet, Take 40 mg by mouth daily, Disp: , Rfl:      EYE ITCH RELIEF 0.025 % ophthalmic solution, PLACE 1 DROP IN EACH EYE 2 TIMES A DAY (EVERY 8 TO 12 HOURS) AS NEEDED, Disp: , Rfl:       fish oil-omega-3 fatty acids 1000 MG capsule, TAKE 1 PILL BY MOUTH TWICE DAILY/ TXHUA HNUB NOJ 1 LUB 2 [facility] ROJ, Disp: , Rfl:      fluconazole (DIFLUCAN) 100 MG tablet, Take 200mg(2 tablets) the first day, then take 100mg (1 tablet) for the remaining 9 days., Disp: 11 tablet, Rfl: 0     ibuprofen (ADVIL/MOTRIN) 800 MG tablet, Take 800 mg by mouth every 8 hours as needed for moderate pain, Disp: , Rfl:      losartan (COZAAR) 100 MG tablet, Take 100 mg by mouth daily, Disp: , Rfl:      losartan (COZAAR) 50 MG tablet, TAKE 1 PILL BY MOUTH DAILY FOR BLOOD PRESSURE / [facility], Disp: , Rfl:      LUBRICATING PLUS EYE DROPS 0.5 % ophthalmic solution, PF FORMULATION. PUT 1-2 DROPS IN EACH EYE AS NEEDED. DISCARD EACH VIAL AFTER SINGLE USE., Disp: , Rfl:      metoprolol succinate ER (TOPROL-XL) 100 MG 24 hr tablet, TAKE 1 PILL BY MOUTH DAILY FOR BLOOD PRESSURE / TXHUA HNUB NOJ 1 [facility] SIAB, Disp: , Rfl:      NONFORMULARY, OTC dry eyes drops as needed, Disp: , Rfl:      nystatin (MYCOSTATIN) 525336 UNIT/ML suspension, Take by mouth 4 times daily Take 1 teaspoonful by mouth 4 times daily for 14 days, Disp: 280 mL, Rfl: 0     polyethylene glycol-propylene glycol (SYSTANE ULTRA) 0.4-0.3 % SOLN ophthalmic solution, Place 1 drop into both eyes 4 times daily as needed for dry eyes, Disp: , Rfl:      QUEtiapine (SEROQUEL) 50 MG tablet, Take 50 mg by mouth At Bedtime, Disp: , Rfl:      SYSTANE PRESERVATIVE FREE 0.4-0.3 % SOLN opthalmic solution, PUT 1-2 DROPS IN EACH EYE 4 TIMES A DAY AS NEEDED. DISCARD EACH VIAL AFTER SINGLE USE, Disp: , Rfl:      topiramate (TOPAMAX) 25 MG tablet, TAKE 1 PILL BY MOUTH EVERY DAY FOR HEADACHE/TXHUA HNUB NOJ 1 LUB TSHUAJ ELIEL PERKINS *NEEDS TO BE SEEN*, Disp: , Rfl:     ALLERGIES: Patient has no known allergies.    SOCIAL HISTORY:    Social History     Socioeconomic History     Marital status: Single      Spouse name: Not on file     Number of children: Not on file     Years of education: Not on file     Highest education level: Not on file   Occupational History     Not on file   Tobacco Use     Smoking status: Never Smoker     Smokeless tobacco: Never Used   Substance and Sexual Activity     Alcohol use: Not on file     Drug use: Not on file     Sexual activity: Not on file   Other Topics Concern     Not on file   Social History Narrative     Not on file     Social Determinants of Health     Financial Resource Strain: Not on file   Food Insecurity: Not on file   Transportation Needs: Not on file   Physical Activity: Not on file   Stress: Not on file   Social Connections: Not on file   Intimate Partner Violence: Not on file   Housing Stability: Not on file         FAMILY HISTORY: No family history on file.   Non-contributory for problems with anesthesia    REVIEW OF SYSTEMS:   The patient was asked a 14 point review of systems regarding constitutional symptoms, eye symptoms, ears, nose, mouth, throat symptoms, cardiovascular symptoms, respiratory symptoms, gastrointestinal symptoms, genitourinary symptoms, musculoskeletal symptoms, integumentary symptoms, neurological symptoms, psychiatric symptoms, endocrine symptoms, hematologic/lymphatic symptoms, and allergic/ immunologic symptoms.   The pertinent factors have been included in the HPI and below.  Patient Supplied Answers to Review of Systems  No flowsheet data found.    Physical Examination   The patient underwent a physical examination as described below. The pertinent positive and negative findings are summarized after the description of the examination.  Constitutional: The patient's developmental and nutritional status was assessed. The patient's voice quality was assessed.  Head and Face: The head and face were inspected for deformities. The sinuses were palpated. The salivary glands were palpated. Facial muscle strength was assessed bilaterally.  Eyes:  Extraocular movements and primary gaze alignment were assessed.  Ears, Nose, Mouth and Throat: The ears and nose were examined for deformities. The nasal septum, mucosa, and turbinates were inspected by anterior rhinoscopy. The lips, teeth, and gums were examined for abnormalities. The oral mucosa, tongue, palate, tonsils, lateral and posterior pharynx were inspected for the presence of asymmetry or mucosal lesions.    Neck: The tracheal position was noted, and the neck mass palpated to determine if there were any asymmetries, abnormal neck masses, thyromegally, or thyroid nodules.  Respiratory: The nature of the breathing and chest expansion/symmetry was observed.  Cardiovascular: The patient was examined to determine the presence of any edema or jugular venous distension.  Abdomen: The contour of the abdomen was noted.  Lymphatic: The patient was examined for infraclavicular lymphadenopathy.  Musculoskeletal: The patient was inspected for the presence of skeletal deformities.  Extremities: The extremities were examined for any clubbing or cyanosis.  Skin: The skin was examined for inflammatory or neoplastic conditions.  Neurologic: The patient's orientation, mood, and affect were noted. The cranial nerve  functions were examined.  Other pertinent positive and negative findings on physical examination:   OC/OP: no lesions, uvula midline, soft palate elevates symmetrically   Neck: no lesions, no TH tenderness to palpation     All other physical examination findings were within normal limits and noncontributory.    Procedures   Video Laryngoscopy with Stroboscopy (CPT 99877)    Preoperative Diagnosis:  Dysphonia and throat symptoms  Postoperative Diagnosis: Dysphonia and throat symptoms  Indication:  Patient has new or persistent dysphonia and throat symptoms.  This requires evaluation by stroboscopy to fully delineate the laryngeal functioning; especially mucosal wave assessment, ultrasharp visualization of lesions  on the vocal folds, and overall functioning of the larynx.  Details of Procedure: A 70 degree rigid telescopic laryngoscope or a distal chip flexible scope was used. The lighting was obtained via a light cable connected to a stroboscopic unit. The telescope was inserted either transorally or transnasally until the vocal folds could be visualized. The patient was instructed to sustain the vowel  ee  at a comfortable pitch and loudness as the voice was monitored by a microphone connected to a fundamental frequency tracker. This circuit tracked vocal periodicity, allowing the light to flash in synchrony with the glottal cycles. A setting on the stroboscope was set to change the phase of light strobing with relation to the vocal fundamental frequency, producing an image of 1 to 2 glottal cycles every second. The video images were recorded for analysis. Use of the variable speed, slow and stop scan allowed careful study of pertinent segments of laryngeal function to increase accuracy of clinical assessments of function and dysfunction.  In particular, features of glottal closure, mucosal wave on the vocal fold cover and laryngeal symmetry were analyzed. Lastly, the patient was asked to phonate speech samples and auditory/perceptual evaluation of voice and resonance were performed.  The vocal quality was carefully evaluated for hoarseness, breathiness, loudness, phrase length and intelligibility to determine the source of dysphonia.    Findings:      B. LARYNGOVIDEOSTROBOSCOPY   Anatomic/Physiological Deviations:  LNC, left vocal fold redness resolved pharyngeal redness, bilateral mucosal wave restriction  Mucosal wave: Right:  Little restriction     Left: Little restriction  Bilateral Vocal Fold Vibration: Asymmetric  Vocal Process: Right: No restriction    Left:  No restriction  Vocal Fold closure: Complete glottal closure    Complication(s): None  Disposition: Patient tolerated the procedure well              Review of  "Relevant Clinical Data   I personally reviewed:  Notes: Raine Nicole 22  \"Tucker reports improvements in her coughing and productivity with antibiotic and antifungal treatments she started last week. Dr. Rodriguez's recommendations for follow-ups and medication changes are in progress, and I plan to help facilitate this as I am able. Tucker endorsed understanding of the cough reduction strategies. Her son was helpful in today's session with communication and help with technology (in the future, remember to use his number with [employer] - [phone]). \"  Labs:  No results found for: TSH  No results found for: NA, CO2, BUN, CREAT, GLUCOSE, PHOS  Lab Results   Component Value Date    HGB 14.5 03/10/2022     No results found for: PT, PTT, INR  No results found for: HARVEY  No components found for: RHEUMATOIDFACTOR,  RF  No results found for: CRP  No components found for: CKTOT, URICACID  No components found for: C3, C4, DSDNAAB, NDNAABIFA  No results found for: MPOAB    Patient reported Quality of Life (QOL) Measures   Patient Supplied Answers To VHI Questionnaire  No flowsheet data found.      Patient Supplied Answers To EAT Questionnaire  No flowsheet data found.      Patient Supplied Answers To CSI Questionnaire  No flowsheet data found.      Patient Supplied Answers to Dyspnea Index Questionnaire:  No flowsheet data found.    Impression & Plan     IMPRESSION: Ms. Foreman is a 57 year old female who is being seen for the followin. Chronic cough  - started in  in the setting of blood pressure medication   - never used cigarettes  - chest xray  - clear  - on losartan  - allergy triggers and work up: denies  - pulmonary triggers and work up: PFTs 3/10/22 - normal, referred by Dr Garcia, tried inhalers without benefit  - GI triggers and work up: does feel reflux, was told she might have an ulcer but she does not have an EGD, was given reflux meds by her primary, she can't sleep at night due to cough, cough is " worse in the morning   - ENT triggers and work up: worse with talking  - scope shows post cricoid ededma, vocal fold with mild erythema with question of fungal laryngitis  - FEES shows safe swallow   - symptoms likely due to ARB BP medication, reflux etiology and irritable larynx syndrome, also has vocal fold erythema so will treat for possible bacterial or fungal laryngitis given history of steroid   - will treat for reflux and obtain esophageal work up   - needs to come off ARB since it can contribute to her cough  - symptoms 8/17/2022 are greatly improved after course of Augmentin, nystatin, and fluconazole as well as 2 cough suppression therapy sessions   - she has had her blood pressure medication changed since our last appointment as well  - she is on PPI BID   - scope shows new left true vocal fold redness, resolved pharyngeal redness  - the new left true vocal fold redness is new and likely due to coughing and will treat with another round of medications given restriction of vocal fold vibration bilaterally   Plan  - follow-up with Dr. Bautista for EGD and pH testing, has manometry scheduled for 9/6/22   - refill Augmentin, nystatin, and fluconazole     RETURN VISIT: October 4th @ 9 AM    Scribe Disclosure:  IEnrike am serving as a scribe to document services personally performed by Evelina Rodriguez MD at this visit, based upon the provider's statements to me. All documentation has been reviewed by the aforementioned provider prior to being entered into the official medical record.     Evelina oRdriguez MD    Laryngology    [facility]  Department of  Otolaryngology - Head and Neck Surgery  Clinics & Surgery Center  [address] [place] [address]  Appointment line: [phone]  Fax: [phone]  [email or web address]

## 2022-08-19 ENCOUNTER — PATIENT OUTREACH (OUTPATIENT)
Dept: GASTROENTEROLOGY | Facility: CLINIC | Age: 57
End: 2022-08-19

## 2022-08-19 DIAGNOSIS — K21.9 GASTROESOPHAGEAL REFLUX DISEASE, UNSPECIFIED WHETHER ESOPHAGITIS PRESENT: ICD-10-CM

## 2022-08-19 DIAGNOSIS — R05.3 CHRONIC COUGH: Primary | ICD-10-CM

## 2022-08-19 NOTE — LETTER
2022       TO: Tucker Foreman  [address]  [place] [address]         Dear Tucker,    Below is the information for your upcoming manometry testing and your appointment with Dr. Bautista. The manometry testing is at [address] [place] [address]. The appointment with Dr. Bautista is a virtual appointment so you will not need to come into clinic that day but you will need to be in the [facility].     Manometry testin22 at 8:30am at [address] [place] [address].     Appointment with Dr. Bautista: 22 at 1:00 pm, a text with a link to the video will be sent to you for this appointment    Manometry Test  Your exam is on 22 Exam Time: 8:30 am Arrival Time: 8:15 am  Check in at the Clinic and Surgery Center [address] [place] [address]   Check in on the 3rd floor.    What is a manometry test?  This test shows the strength and function of your swallowing muscles. It also tells us the exact location of the lower muscle in your esophagus (food pipe) and stomach.    Manometry can help find the cause of acid reflux, heartburn, trouble swallowing and some types of chest pain. Or, it is done before surgery or other tests.    The test takes 45 to 60 minutes. We will:    Moisten the inside of your nose with gel.    Put a small tube in your nose, down your esophagus and into your stomach.    You will be given a series of saline (salty water). Tiny sensors on the tube will record the movements of your esophagus as you swallow.    Remove the tube.      Getting ready    Do not eat or drink 4 hours before the test. (It is okay to take medicines with a small amount of water.)      Unless your provider says it is okay, do not take these medicines for 4 hours before your test(s):  - pain medicine  - sedatives or tranquilizers  - antispasmodics  - promotility medicines  - anti-depressants      You may drive yourself to and from the test(s).    Test results  - You will  receive your results in 2-4 weeks by phone, letter or [employer].      One to two days before your procedure, take an at-home, rapid antigen test. You can purchase these at many stores and pharmacies. You can order free, at-home tests from the federal government by visiting the website covid.gov/tests. If you are unable to find an at-home, rapid antigen test, schedule a PCR test with  [email or web address] by calling [phone]. You can also schedule your PCR test by signing into or signing up for [employer] and completing a COVID-19 symptom check. Once complete, you will get a message sent to your home screen. Use that message to schedule your asymptomatic COVID-19 test. Make sure to choose only COVID-19 testing and indicate you are not symptomatic. Do not choose testing for multiple illnesses (COVID-19, influenza, strep, RSV).    We can t accept at-home, rapid antigen test results that are more than four days old.    If your test is negative, please take a photo of the test. Show the photo to the nurse when you come in for your procedure.    If your test is positive, please call [phone], option 2 to discuss rescheduling options. You will not be able to complete procedure with a positive test result.    For questions or appointments, call:  [facility]   GI Clinic  [phone]        Sincerely,      Desiree Rios

## 2022-08-19 NOTE — TELEPHONE ENCOUNTER
Spoke with pt with [facility] , scheduled manometry. Unable to schedule EGD with [facility]ares at this time due to Dr. Bautista's schedule booked out. Endo schedulers will call pt to schedule her EGD. Sent letter in mail about manometry testing and appointment with Dr. Bautista.

## 2022-08-23 ENCOUNTER — OFFICE VISIT (OUTPATIENT)
Dept: OTOLARYNGOLOGY | Facility: CLINIC | Age: 57
End: 2022-08-23
Payer: COMMERCIAL

## 2022-08-23 VITALS
SYSTOLIC BLOOD PRESSURE: 133 MMHG | DIASTOLIC BLOOD PRESSURE: 85 MMHG | HEIGHT: 59 IN | BODY MASS INDEX: 25.45 KG/M2 | WEIGHT: 126.23 LBS | HEART RATE: 90 BPM

## 2022-08-23 DIAGNOSIS — R05.3 CHRONIC COUGH: Primary | ICD-10-CM

## 2022-08-23 DIAGNOSIS — J38.7 IRRITABLE LARYNX SYNDROME: ICD-10-CM

## 2022-08-23 DIAGNOSIS — B37.89 LARYNGEAL CANDIDIASIS: ICD-10-CM

## 2022-08-23 DIAGNOSIS — R49.0 DYSPHONIA: ICD-10-CM

## 2022-08-23 DIAGNOSIS — K21.9 LARYNGOPHARYNGEAL REFLUX: ICD-10-CM

## 2022-08-23 PROCEDURE — 99207 PR NO BILLABLE SERVICE THIS VISIT: CPT | Performed by: SPEECH-LANGUAGE PATHOLOGIST

## 2022-08-23 PROCEDURE — 99214 OFFICE O/P EST MOD 30 MIN: CPT | Mod: 25 | Performed by: OTOLARYNGOLOGY

## 2022-08-23 PROCEDURE — 31579 LARYNGOSCOPY TELESCOPIC: CPT | Performed by: OTOLARYNGOLOGY

## 2022-08-23 RX ORDER — NYSTATIN 100000/ML
SUSPENSION, ORAL (FINAL DOSE FORM) ORAL 4 TIMES DAILY
Qty: 280 ML | Refills: 0 | Status: SHIPPED | OUTPATIENT
Start: 2022-08-23

## 2022-08-23 RX ORDER — FLUCONAZOLE 100 MG/1
TABLET ORAL
Qty: 11 TABLET | Refills: 0 | Status: SHIPPED | OUTPATIENT
Start: 2022-08-23 | End: 2022-10-13

## 2022-08-23 ASSESSMENT — PAIN SCALES - GENERAL: PAINLEVEL: NO PAIN (0)

## 2022-08-23 NOTE — PATIENT INSTRUCTIONS
1.  You were seen in the ENT Clinic today by . If you have any questions or concerns after your appointment, please call [phone]. Press option #1 for scheduling related needs. Press option #3 for Nurse advice.    2.   has recommended  the following:   - continue taking nystatin as instructed. Refill sent to your pharmacy   - continue taking Flucanozole as instructed. Refill sent to your pharmacy   - continue Augmentin as instructed. Refill sent to your pharmacy    3.  Plan is to return to clinic 10/4/22 at 9:00 am      Rosey Roman LPN  [phone]  [facility] - Otolaryngology

## 2022-08-23 NOTE — LETTER
8/23/2022       RE: Tucker Foreman  [address]  [place] [address]     Dear Colleague,    Thank you for referring your patient, Tucker Foreman, to the [facility]. Please see a copy of my visit note below.    [facility]  Clinical Voice and Upper Airway Evaluation Report    Patient's Name: Tucker Foreman  Date of Evaluation: 8/23/22  Providing SLP: Raine Nicole MS CCC-SLP  Seen in Conjunction With: Evelina Rodriguez MD - Otolaryngology  Referring Provider and Facility: Ila Garcia MD - Pulmonology  Chief Complaint: cough  Assessment / Treatment Time: 30 minutes  Evaluation Location: [facility]  Others in Attendance for the Evaluation: her  and son - Elissa       Impressions from most recent evaluation on 8/4/22 with Raine Nicole MS CCC-SLP:      Tucker is a 57-year-old female with chronic cough (R05.9) and subsequent dysphonia (R49.0) beginning in 1999 following ACE inhibitor use and irritable larynx syndrome (J38.7). Perceptually, her voice is mildly strained, and she has frequent 1-2 coughs that sound productive, as well as paroxysmal coughing not observed during today's session. Under laryngoscopy, her larynx demonstrated signs of laryngopharyngeal reflux (e.g. 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 Dr. Rodriguez's recommendations are as follows:    Adjust GERD medications as needed    Schedule EGD    Communicate with PCP Dr. Lee to switch her blood pressure medications    Treat laryngeal candidiasis with nystatin, augmentin, and fluconazole    Recommend voice therapy for cough suppression       Patient Updates from today:    Tucker continues to endorse improvements in her coughing since taking antifungal and  antibiotic medications prescribed by Dr. Rodriguez at our initial session, switching off lisinopril recently, and using cough suppression techniques. Her coughing has not been completely eliminated but is significantly less frequent. It continues to be productive-sounding.       Quality of Life Questionnaires: not completed today      Laryngoscopy with stroboscopy:    Provider performing exam: Evelina Rodriguez MD    Informed consent: Informed consent was obtained, which includes potential side-effects, risks, and benefits of the procedure.     Anesthetic: No anesthetic was used. Topical anesthesia with 3% lidocaine and 0.25% phenylephrine was applied the nostrils bilaterally. Viscous lidocaine 4% was applied to the tip of the endoscope.    Scope type: A distal chip flexible laryngoscope was passed through the nare with halogen and xenon light sources.     The laryngeal and pharyngeal structures were evaluated for gross appearance, mobility, function, and focal lesions / abnormalities of the associated mucosa.  All findings were within normal limits with the exception of the following salient features:     Appearance: edema of the post-cricoid region with interarytenoid pachydermia    Left (L) Vocal Fold Edge: edematous and diffusely pink in color    Right (R) Vocal Fold Edge: very small white patch at the anterior 1/3 junction lateral/near the ventricular fold; edematous vocal fold     L Amplitude: reduced    L Mucosal Wave:  reduced    Phase Symmetry: asymmetrical due to reduced pliability of L vocal fold       Impressions and Plan:     Tucker is a 57-year-old female with chronic cough (R05.9) and subsequent dysphonia (R49.0) beginning in 1999 following ACE inhibitor use and irritable larynx syndrome (J38.7). Her coughing has significantly reduced since prescribing antifungal and antibiotics during her last appointment, utilizing cough suppression strategies learned in therapy, and switching to a different blood pressure  medication. On laryngoscopy today, there is diffuse swelling of post-cricoid tissues and the true vocal folds secondary to LPR (K21.9), a small white candidiasis patch on the R vocal fold (B37.89), as well as diffuse erythema of the L vocal fold, possibly consistent with laryngitis vs vocal fold hemorrhage. Dr. Rodriguez prescribed her another round of these medications and continue to aid in scheduling an EGD procedure with GI. I plan to follow-up with Tucker francois in 2 weeks to check on her progress following another round of medications and continue providing cough suppression/hypersensitivity reduction strategies. Her current goals will continue. No skilled services were provided today, so a no charge appointment is appropriate.      Billed Procedures:    No charge facility fee      Thank you for allowing me to participate in this patient's care,    Raine Nicole MS CCC-SLP  Speech-Language Pathologist  [facility] Voice [facility]  [email or web address]  [phone]

## 2022-08-23 NOTE — PROGRESS NOTES
[facility]  Clinical Voice and Upper Airway Evaluation Report    Patient's Name: Tucker Foreman  Date of Evaluation: 8/23/22  Providing SLP: Raine Nicole MS CCC-SLP  Seen in Conjunction With: Evelina Rodriguez MD - Otolaryngology  Referring Provider and Facility: Ila Garcia MD - Pulmonology  Chief Complaint: cough  Assessment / Treatment Time: 30 minutes  Evaluation Location: [facility]  Others in Attendance for the Evaluation: her  and son - Elissa       Impressions from most recent evaluation on 8/4/22 with Raine Nicole MS CCC-SLP:      Tucker is a 57-year-old female with chronic cough (R05.9) and subsequent dysphonia (R49.0) beginning in 1999 following ACE inhibitor use and irritable larynx syndrome (J38.7). Perceptually, her voice is mildly strained, and she has frequent 1-2 coughs that sound productive, as well as paroxysmal coughing not observed during today's session. Under laryngoscopy, her larynx demonstrated signs of laryngopharyngeal reflux (e.g. 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 Dr. Rodriguez's recommendations are as follows:    Adjust GERD medications as needed    Schedule EGD    Communicate with PCP Dr. Lee to switch her blood pressure medications    Treat laryngeal candidiasis with nystatin, augmentin, and fluconazole    Recommend voice therapy for cough suppression       Patient Updates from today:    Tucker continues to endorse improvements in her coughing since taking antifungal and antibiotic medications prescribed by Dr. Rodriguez at our initial session, switching off lisinopril recently, and using cough suppression techniques. Her coughing has not been completely eliminated but is significantly less frequent. It continues to be productive-sounding.       Quality of Life Questionnaires: not  completed today      Laryngoscopy with stroboscopy:    Provider performing exam: Evelina Rodriguez MD    Informed consent: Informed consent was obtained, which includes potential side-effects, risks, and benefits of the procedure.     Anesthetic: No anesthetic was used. Topical anesthesia with 3% lidocaine and 0.25% phenylephrine was applied the nostrils bilaterally. Viscous lidocaine 4% was applied to the tip of the endoscope.    Scope type: A distal chip flexible laryngoscope was passed through the nare with halogen and xenon light sources.     The laryngeal and pharyngeal structures were evaluated for gross appearance, mobility, function, and focal lesions / abnormalities of the associated mucosa.  All findings were within normal limits with the exception of the following salient features:     Appearance: edema of the post-cricoid region with interarytenoid pachydermia    Left (L) Vocal Fold Edge: edematous and diffusely pink in color    Right (R) Vocal Fold Edge: very small white patch at the anterior 1/3 junction lateral/near the ventricular fold; edematous vocal fold     L Amplitude: reduced    L Mucosal Wave:  reduced    Phase Symmetry: asymmetrical due to reduced pliability of L vocal fold       Impressions and Plan:     Tucker is a 57-year-old female with chronic cough (R05.9) and subsequent dysphonia (R49.0) beginning in 1999 following ACE inhibitor use and irritable larynx syndrome (J38.7). Her coughing has significantly reduced since prescribing antifungal and antibiotics during her last appointment, utilizing cough suppression strategies learned in therapy, and switching to a different blood pressure medication. On laryngoscopy today, there is diffuse swelling of post-cricoid tissues and the true vocal folds secondary to LPR (K21.9), a small white candidiasis patch on the R vocal fold (B37.89), as well as diffuse erythema of the L vocal fold, possibly consistent with laryngitis vs vocal fold hemorrhage.   Michael prescribed her another round of these medications and continue to aid in scheduling an EGD procedure with GI. I plan to follow-up with Tucker francois in 2 weeks to check on her progress following another round of medications and continue providing cough suppression/hypersensitivity reduction strategies. Her current goals will continue. No skilled services were provided today, so a no charge appointment is appropriate.      Billed Procedures:    No charge facility fee      Thank you for allowing me to participate in this patient's care,    Raine Nicole MS CCC-SLP  Speech-Language Pathologist  [facility]  [email or web address]  [phone]

## 2022-08-23 NOTE — LETTER
2022       RE: Tucker Foreman  [address]  [place] [address]     Dear Colleague,    Thank you for referring your patient, Tucker Foreman, to the [facility] [place] at [facility]. Please see a copy of my visit note below.        [facility]   at the [facility]   Otolaryngology Clinic     Patient: Tucker Foreman    MRN: [id number]    : 1965    Age/Gender: 57 year old female  Date of Service: 2022  Rendering Provider:   Evelina Rodriguez MD     Chief Complaint   Chronic cough  Dyspnea  Interval History   HISTORY OF PRESENT ILLNESS: Ms. Foreman is a 57 year old female is being followed for chronic cough. she was initially seen on 22. Please refer to this note for full history.     Of note she has a history of GERD on daily PPI and no history of asthma. Her preferred language is Hmong.     Today, she presents for follow up. she reports:  - started voice therapy  - took her course of Augmentin, nystatin, and fluconazole and her cough has improved greatly since  - it is not completely resolved, but a lot better  - she has been doing cough suppression therapy exercises at home    PAST MEDICAL HISTORY: No past medical history on file.    PAST SURGICAL HISTORY: No past surgical history on file.    CURRENT MEDICATIONS:   Current Outpatient Medications:      albuterol (PROAIR HFA/PROVENTIL HFA/VENTOLIN HFA) 108 (90 Base) MCG/ACT inhaler, Inhale 2 puffs into the lungs every 6 hours as needed for shortness of breath / dyspnea or wheezing, Disp: , Rfl:      amoxicillin-clavulanate (AUGMENTIN) 875-125 MG tablet, Take 1 tablet by mouth 2 times daily for 14 days, Disp: 28 tablet, Rfl: 0     benzonatate (TESSALON) 200 MG capsule, Take 1 capsule (200 mg) by mouth 3 times daily as needed for cough, Disp: 90 capsule, Rfl: 4     budesonide-formoterol (SYMBICORT) 160-4.5 MCG/ACT Inhaler, Inhale 2 puffs into the lungs 2  times daily, Disp: , Rfl:      cetirizine (ZYRTEC) 10 MG tablet, Take 10 mg by mouth daily, Disp: , Rfl:      citalopram (CELEXA) 40 MG tablet, Take 40 mg by mouth daily, Disp: , Rfl:      EYE ITCH RELIEF 0.025 % ophthalmic solution, PLACE 1 DROP IN EACH EYE 2 TIMES A DAY (EVERY 8 TO 12 HOURS) AS NEEDED, Disp: , Rfl:      fish oil-omega-3 fatty acids 1000 MG capsule, TAKE 1 PILL BY MOUTH TWICE DAILY/ TXHUA HNUB NOJ 1 LUB 2 [facility], Disp: , Rfl:      fluconazole (DIFLUCAN) 100 MG tablet, Take 200mg(2 tablets) the first day, then take 100mg (1 tablet) for the remaining 9 days., Disp: 11 tablet, Rfl: 0     ibuprofen (ADVIL/MOTRIN) 800 MG tablet, Take 800 mg by mouth every 8 hours as needed for moderate pain, Disp: , Rfl:      losartan (COZAAR) 100 MG tablet, Take 100 mg by mouth daily, Disp: , Rfl:      losartan (COZAAR) 50 MG tablet, TAKE 1 PILL BY MOUTH DAILY FOR BLOOD PRESSURE / TXHUA HNUB NOJ 1 LUB [facility], Disp: , Rfl:      LUBRICATING PLUS EYE DROPS 0.5 % ophthalmic solution, PF FORMULATION. PUT 1-2 DROPS IN EACH EYE AS NEEDED. DISCARD EACH VIAL AFTER SINGLE USE., Disp: , Rfl:      metoprolol succinate ER (TOPROL-XL) 100 MG 24 hr tablet, TAKE 1 PILL BY MOUTH DAILY FOR BLOOD PRESSURE / TXHUA HNUB NOJ 1 LUB [facility], Disp: , Rfl:      NONFORMULARY, OTC dry eyes drops as needed, Disp: , Rfl:      nystatin (MYCOSTATIN) 959886 UNIT/ML suspension, Take by mouth 4 times daily Take 1 teaspoonful by mouth 4 times daily for 14 days, Disp: 280 mL, Rfl: 0     polyethylene glycol-propylene glycol (SYSTANE ULTRA) 0.4-0.3 % SOLN ophthalmic solution, Place 1 drop into both eyes 4 times daily as needed for dry eyes, Disp: , Rfl:      QUEtiapine (SEROQUEL) 50 MG tablet, Take 50 mg by mouth At Bedtime, Disp: , Rfl:      SYSTANE PRESERVATIVE FREE 0.4-0.3 % SOLN opthalmic solution, PUT 1-2 DROPS IN EACH EYE 4 TIMES A DAY AS NEEDED. DISCARD EACH VIAL AFTER SINGLE USE, Disp:  , Rfl:      topiramate (TOPAMAX) 25 MG tablet, TAKE 1 PILL BY MOUTH EVERY DAY FOR HEADACHE/TXHUA HNUB NOJ 1 LUB TSHUAJ PAB [facility] MELISSA GREGORIO *NEEDS TO BE SEEN*, Disp: , Rfl:     ALLERGIES: Patient has no known allergies.    SOCIAL HISTORY:    Social History     Socioeconomic History     Marital status: Single     Spouse name: Not on file     Number of children: Not on file     Years of education: Not on file     Highest education level: Not on file   Occupational History     Not on file   Tobacco Use     Smoking status: Never Smoker     Smokeless tobacco: Never Used   Substance and Sexual Activity     Alcohol use: Not on file     Drug use: Not on file     Sexual activity: Not on file   Other Topics Concern     Not on file   Social History Narrative     Not on file     Social Determinants of Health     Financial Resource Strain: Not on file   Food Insecurity: Not on file   Transportation Needs: Not on file   Physical Activity: Not on file   Stress: Not on file   Social Connections: Not on file   Intimate Partner Violence: Not on file   Housing Stability: Not on file         FAMILY HISTORY: No family history on file.   Non-contributory for problems with anesthesia    REVIEW OF SYSTEMS:   The patient was asked a 14 point review of systems regarding constitutional symptoms, eye symptoms, ears, nose, mouth, throat symptoms, cardiovascular symptoms, respiratory symptoms, gastrointestinal symptoms, genitourinary symptoms, musculoskeletal symptoms, integumentary symptoms, neurological symptoms, psychiatric symptoms, endocrine symptoms, hematologic/lymphatic symptoms, and allergic/ immunologic symptoms.   The pertinent factors have been included in the HPI and below.  Patient Supplied Answers to Review of Systems  No flowsheet data found.    Physical Examination   The patient underwent a physical examination as described below. The pertinent positive and negative findings are summarized after the description of the  examination.  Constitutional: The patient's developmental and nutritional status was assessed. The patient's voice quality was assessed.  Head and Face: The head and face were inspected for deformities. The sinuses were palpated. The salivary glands were palpated. Facial muscle strength was assessed bilaterally.  Eyes: Extraocular movements and primary gaze alignment were assessed.  Ears, Nose, Mouth and Throat: The ears and nose were examined for deformities. The nasal septum, mucosa, and turbinates were inspected by anterior rhinoscopy. The lips, teeth, and gums were examined for abnormalities. The oral mucosa, tongue, palate, tonsils, lateral and posterior pharynx were inspected for the presence of asymmetry or mucosal lesions.    Neck: The tracheal position was noted, and the neck mass palpated to determine if there were any asymmetries, abnormal neck masses, thyromegally, or thyroid nodules.  Respiratory: The nature of the breathing and chest expansion/symmetry was observed.  Cardiovascular: The patient was examined to determine the presence of any edema or jugular venous distension.  Abdomen: The contour of the abdomen was noted.  Lymphatic: The patient was examined for infraclavicular lymphadenopathy.  Musculoskeletal: The patient was inspected for the presence of skeletal deformities.  Extremities: The extremities were examined for any clubbing or cyanosis.  Skin: The skin was examined for inflammatory or neoplastic conditions.  Neurologic: The patient's orientation, mood, and affect were noted. The cranial nerve  functions were examined.  Other pertinent positive and negative findings on physical examination:   OC/OP: no lesions, uvula midline, soft palate elevates symmetrically   Neck: no lesions, no TH tenderness to palpation     All other physical examination findings were within normal limits and noncontributory.    Procedures   Video Laryngoscopy with Stroboscopy (CPT 85601)    Preoperative Diagnosis:   Dysphonia and throat symptoms  Postoperative Diagnosis: Dysphonia and throat symptoms  Indication:  Patient has new or persistent dysphonia and throat symptoms.  This requires evaluation by stroboscopy to fully delineate the laryngeal functioning; especially mucosal wave assessment, ultrasharp visualization of lesions on the vocal folds, and overall functioning of the larynx.  Details of Procedure: A 70 degree rigid telescopic laryngoscope or a distal chip flexible scope was used. The lighting was obtained via a light cable connected to a stroboscopic unit. The telescope was inserted either transorally or transnasally until the vocal folds could be visualized. The patient was instructed to sustain the vowel  ee  at a comfortable pitch and loudness as the voice was monitored by a microphone connected to a fundamental frequency tracker. This circuit tracked vocal periodicity, allowing the light to flash in synchrony with the glottal cycles. A setting on the stroboscope was set to change the phase of light strobing with relation to the vocal fundamental frequency, producing an image of 1 to 2 glottal cycles every second. The video images were recorded for analysis. Use of the variable speed, slow and stop scan allowed careful study of pertinent segments of laryngeal function to increase accuracy of clinical assessments of function and dysfunction.  In particular, features of glottal closure, mucosal wave on the vocal fold cover and laryngeal symmetry were analyzed. Lastly, the patient was asked to phonate speech samples and auditory/perceptual evaluation of voice and resonance were performed.  The vocal quality was carefully evaluated for hoarseness, breathiness, loudness, phrase length and intelligibility to determine the source of dysphonia.    Findings:      B. LARYNGOVIDEOSTROBOSCOPY   Anatomic/Physiological Deviations:  LNC, left vocal fold redness resolved pharyngeal redness, bilateral mucosal wave  "restriction  Mucosal wave: Right:  Little restriction     Left: Little restriction  Bilateral Vocal Fold Vibration: Asymmetric  Vocal Process: Right: No restriction    Left:  No restriction  Vocal Fold closure: Complete glottal closure    Complication(s): None  Disposition: Patient tolerated the procedure well              Review of Relevant Clinical Data   I personally reviewed:  Notes: Raine Nicole 24  \"Tucker reports improvements in her coughing and productivity with antibiotic and antifungal treatments she started last week. Dr. Rodriguez's recommendations for follow-ups and medication changes are in progress, and I plan to help facilitate this as I am able. Tucker endorsed understanding of the cough reduction strategies. Her son was helpful in today's session with communication and help with technology (in the future, remember to use his number with [employer] - [phone]). \"  Labs:  No results found for: TSH  No results found for: NA, CO2, BUN, CREAT, GLUCOSE, PHOS  Lab Results   Component Value Date    HGB 14.5 03/10/2022     No results found for: PT, PTT, INR  No results found for: HARVEY  No components found for: RHEUMATOIDFACTOR,  RF  No results found for: CRP  No components found for: CKTOT, URICACID  No components found for: C3, C4, DSDNAAB, NDNAABIFA  No results found for: MPOAB    Patient reported Quality of Life (QOL) Measures   Patient Supplied Answers To VHI Questionnaire  No flowsheet data found.      Patient Supplied Answers To EAT Questionnaire  No flowsheet data found.      Patient Supplied Answers To CSI Questionnaire  No flowsheet data found.      Patient Supplied Answers to Dyspnea Index Questionnaire:  No flowsheet data found.    Impression & Plan     IMPRESSION: Ms. Foreman is a 57 year old female who is being seen for the followin. Chronic cough  - started in  in the setting of blood pressure medication   - never used cigarettes  - chest xray  - clear  - on losartan  - allergy " triggers and work up: denies  - pulmonary triggers and work up: PFTs 3/10/22 - normal, referred by Dr Garcia, tried inhalers without benefit  - GI triggers and work up: does feel reflux, was told she might have an ulcer but she does not have an EGD, was given reflux meds by her primary, she can't sleep at night due to cough, cough is worse in the morning   - ENT triggers and work up: worse with talking  - scope shows post cricoid ededma, vocal fold with mild erythema with question of fungal laryngitis  - FEES shows safe swallow   - symptoms likely due to ARB BP medication, reflux etiology and irritable larynx syndrome, also has vocal fold erythema so will treat for possible bacterial or fungal laryngitis given history of steroid   - will treat for reflux and obtain esophageal work up   - needs to come off ARB since it can contribute to her cough  - symptoms 8/17/2022 are greatly improved after course of Augmentin, nystatin, and fluconazole as well as 2 cough suppression therapy sessions   - she has had her blood pressure medication changed since our last appointment as well  - she is on PPI BID   - scope shows new left true vocal fold redness, resolved pharyngeal redness  - the new left true vocal fold redness is new and likely due to coughing and will treat with another round of medications given restriction of vocal fold vibration bilaterally   Plan  - follow-up with Dr. Bautista for EGD and pH testing, has manometry scheduled for 9/6/22   - refill Augmentin, nystatin, and fluconazole     RETURN VISIT: October 4th @ 9 AM    Scribe Disclosure:  Enrike OROZCO am serving as a scribe to document services personally performed by Evelina Rodriguez MD at this visit, based upon the provider's statements to me. All documentation has been reviewed by the aforementioned provider prior to being entered into the official medical record.     Evelina Rodriguez MD    Laryngology    [facility]  Department  of  Otolaryngology - Head and Neck Surgery  Clinics & Surgery Center  [address] [place] [id number]  Appointment line: [phone]  Fax: [phone]  [email or web address]

## 2022-08-25 ENCOUNTER — TELEPHONE (OUTPATIENT)
Dept: GASTROENTEROLOGY | Facility: CLINIC | Age: 57
End: 2022-08-25

## 2022-08-25 NOTE — TELEPHONE ENCOUNTER
Screening Questions    BlueKIND OF PREP RedLOCATION [review exclusion criteria] GreenSEDATION TYPE      1. Are you active on mychart? N    2. What insurance is in the chart? [employer]     3.   Ordering/Referring Provider: Michele    4. BMI   (If greater than 40 review exclusion criteria [PAC APPT IF [MAC] @ [facility])  25.4  [If yes, BMI OVER 40-EXTENDED PREP]      **(Sedation review/consideration needed)**  Do you have a legal guardian or Medical Power of    and/or are you able to give consent for your medical care?     y    5. Have you had a positive covid test in the last 90 days?   n - n    6.  Are you currently on dialysis?   n [ If yes, G-PREP & HOSPITAL setting ONLY]     7.  Do you have chronic kidney disease?  n [ If yes, G-PREP ]    8.   Do you have a diagnosis of diabetes?   N   [ If yes, G-PREP ]    9.  On a regular basis do you go 3-5 days between bowel movements?      [ If yes, EXTENDED PREP]    10.  Are you taking any prescription pain medications on a routine schedule?    Y DAILY [ If yes, EXTENDED PREP] [If yes, MAC]      11.   Do you have any chemical dependencies such as alcohol, street drugs, or methadone?    N [If yes, MAC]    12.   Do you have any history of post-traumatic stress syndrome, severe anxiety or history of psychosis?    N  [If yes, MAC]    13.  [FEMALES] Are you currently pregnant?     If yes, how many weeks?       Respiratory/Heart Screening:  [If yes to any of the following HOSPITAL setting only]     14. Do you have Pulmonary Hypertension [Lungs]?   N       15. Do you have UNCONTROLLED asthma?   N     16.  Do you use daily home oxygen?  N      17. Do you have mod to severe Obstructive Sleep Apnea?         (OKAY @ [facility]  [facility]  SH  PH  RI  MG - if pt is not on OXYGEN)  N      18.   Have you had a heart or lung transplant?   N      19.   Have you had a stroke or Transient ischemic attack (TIA - aka  mini stroke ) within 6 months?  (If yes, please review exclusion criteria)  N     20.    In the past 6 months, have you had any heart related issues including cardiomyopathy or heart attack?   N           If yes, did it require cardiac stenting or other implantable device?   N      21.   Do you have any implantable devices in your body (pacemaker, defib, LVAD)? (If yes, please review exclusion criteria)  N   22.  Do you take the medication Phentermine?  NO        23. Do you take nitroglycerin?   N           If yes, how often? N  (if yes, HOSPITAL setting ONLY)    24.  Are you currently taking any blood thinners?    [If yes, INFORM patient to follw  w/ ORDERING PROVIDER FOR BRIDGING INSTRUCTIONS]     N    25.   Do you transfer independently?                (If NO, please HOSPITAL setting ONLY)  Y    26.   Preferred LOCAL Pharmacy for Pre Prescription:      [facility] - [place], [place] - [address]    Scheduling Details  (Please ask for phone number if not scheduled by patient)    Caller : Tucker, along with lawrence Marcano as   Date of Procedure: 10/13  Surgeon: Michele  Location: [facility]    Sedation Type: MAC l per order  Conscious Sedation- Needs  for 6 hours after the procedure  MAC/General-Needs  for 24 hours after procedure    n :[Pre-op Required] at [facility]  SH  MG and OR for MAC sedation   (advise patient they will need a pre-op WITH IN 30 DAYS of procedure date)     Type of Procedure Scheduled:   Upper Endoscopy with BRAVO [EGD BRAVO]  Manometry was scheduled by Stephanie (see staff msg below) on 9/6.     Which Colonoscopy Prep was Sent?:    -     KHORUTS CF PATIENTS & GROEN'S PATIENTS NEEDS EXTENDED PREP       Informed patient they will need an adult  Y  Cannot take any type of public or medical transportation alone    Pre-Procedure Covid test to be completed at [facility] or Externally: HOME     Confirmed Nurse will call to complete assessment Y    Additional comments: Preassess should ask for JACQUELINE Sam (Son)   [phone] (Mobile)    Staff  Msgs:    From: Desiree Rios RN   Sent: 8/23/2022   3:20 PM CDT   To: Desiree Rios RN, Aldair Bautista DO, *     Kendrick Hdz,     I called with this pt last week to get her scheduled for EGD with NICOLE with Dr. Bautista. I know he is booking out quite far, but I think he might be getting more time at the [facility] on Mondays in the AM (not sure if this showing up yet). Due to pt needing [facility]  we would like to have her EGD and BRAVO at the [facility] so we (Melida and myself) can help with the education prior to procedure.     Let us know how we can help.     Thanks,   Desiree   ----- Message -----   From: Evelina Rodriguez MD   Sent: 8/23/2022   2:53 PM CDT   To: Desiree Rios RN, Aldair Bautista DO, *     Hi all   I just saw Song     I dont think she's yet scheduled with endo     Just want to make sure it doesn't fall through the cracks.     Thank you,   Evelina       ----- Message -----   From: Desiree Rios RN   Sent: 8/19/2022   3:35 PM CDT   To: Aldair Bautista DO, Melida [place], PAM, *     Scheduled manometry and appointment with you. Wilder will call her to schedule her EGD so may need to move the appointment depending on availability for your schedule.     My thought is have her do the Bravo testing here since Melida and I do the education for it and this pt needs an  (we can do more to help with education here at [facility] if she fits the criteria to have it here).     Desiree   ----- Message -----   From: Aldair Bautista DO   Sent: 8/12/2022  10:08 AM CDT   To: Desiree Rios RN, Melida [place], RN, *     Thanks!     Team lets get the patient set up for EGD w Bravo OFF therapy and manometry. Video visit after tests complete.     Thanks!   jarret   ----- Message -----   From: Evelina Rodriguez MD   Sent: 8/4/2022  12:42 PM CDT   To: DO Kendrick Lucas   New referral to you   Needs an EGD and then she also needs pH testing and manometry -for cough and persistent burping during the exam.   Her cough is  significant and can't sleep because she coughs when laying down   I will start her on PPI BID dosing.     Thank you   Evelina

## 2022-09-01 ENCOUNTER — PATIENT OUTREACH (OUTPATIENT)
Dept: GASTROENTEROLOGY | Facility: CLINIC | Age: 57
End: 2022-09-01

## 2022-09-01 NOTE — PROGRESS NOTES
Reached out to pt via  to go over information and instructions for esophageal manometry testing on Tuesday 9/6.  Spoke with pt's son who will be with pt for appointment, full understanding of instructions verbalized.

## 2022-09-06 ENCOUNTER — OFFICE VISIT (OUTPATIENT)
Dept: GASTROENTEROLOGY | Facility: CLINIC | Age: 57
End: 2022-09-06
Payer: COMMERCIAL

## 2022-09-06 VITALS
SYSTOLIC BLOOD PRESSURE: 146 MMHG | HEIGHT: 57 IN | BODY MASS INDEX: 29.34 KG/M2 | RESPIRATION RATE: 16 BRPM | TEMPERATURE: 98.1 F | OXYGEN SATURATION: 99 % | WEIGHT: 136 LBS | DIASTOLIC BLOOD PRESSURE: 89 MMHG

## 2022-09-06 DIAGNOSIS — R05.3 CHRONIC COUGH: Primary | ICD-10-CM

## 2022-09-06 DIAGNOSIS — K21.9 GASTROESOPHAGEAL REFLUX DISEASE, UNSPECIFIED WHETHER ESOPHAGITIS PRESENT: ICD-10-CM

## 2022-09-06 PROCEDURE — 91010 ESOPHAGUS MOTILITY STUDY: CPT

## 2022-09-06 PROCEDURE — 91037 ESOPH IMPED FUNCTION TEST: CPT

## 2022-09-06 ASSESSMENT — PAIN SCALES - GENERAL: PAINLEVEL: NO PAIN (0)

## 2022-09-06 NOTE — PATIENT INSTRUCTIONS
Esophogeal Manometry Study  1. Resume regular diet.  2. You may have a bloody nose or sore throat after the procedure.  3. If you have questions call [phone] from 7:00am-5:00pm.  For afterhours questions call GI doctor on call at [phone].

## 2022-09-06 NOTE — PROGRESS NOTES
Non-Invasive GI Procedure Visit    Tucker Foreman is a 57 year old female with history of    Chronic cough  Gastroesophageal reflux disease, unspecified whether esophagitis present.   Patient stated reason for procedure: chronic cough  COVID-19 Test Performed within 48-72hrs of the procedure:  Yes. COVID-19 result was NEGATIVE.    COVID-19 PCR Results    COVID-19 PCR Results   No data to display.         COVID-19 Antibody Results, Testing for Immunity    COVID-19 Antibody Results, Testing for Immunity   No data to display.             Pre-Procedure Assessment  Patient presents to clinic today for Esophageal Manometry Study    Referring Provider: Dr Bautista  Patient has not undergone previous endoscopy.    Does patient report taking a PPI (omeprazole, pantoprazole, rabeprazole, lansoprazole, esomeprazole, dexlansoprazole)? No  Does patient report taking a H2 blocker (ranitidine, or famotidine)? No  Does patient report taking opioids? No  Patient reported that last food and/or drink was last consumed 10 hours ago.  Esophageal Questionnaire(s) Completed:   Yes - Esophageal Questionnaire(s)    BEDQ Questionnaire  BEDQ Questionnaire: How Often Have You Had the Following? 9/6/2022   Trouble eating solid food (meat, bread, vegetables) 0   Trouble eating soft foods (yogurt, jello, pudding) 0   Trouble swallowing liquids 0   Pain while swallowing 0   Coughing or choking while swallowing foods or liquids 0   Total Score: 0     BEDQ Questionnaire: Discomfort/Pain Ratings 9/6/2022   Eating solid food (meat, bread, vegetables) 0   Eating soft foods (yogurt, jello, pudding) 0   Drinking liquid 0   Total Score: 0       Eckardt Questionnaire  Eckardt Questionnaire 9/6/2022   Dysphagia 0   Regurgitation 0   Retrosternal Pain 0   Weight Loss (kg) 0   Total Score:  0       Promis 10 Questionnaire  PROMIS 10 FLOWSHEET DATA 9/6/2022   In general, would you say your health is: 3   In general, would you say your quality of life is: 2   In  "general, how would you rate your physical health? 4   In general, how would you rate your mental health, including your mood and your ability to think? 3   In general, how would you rate your satisfaction with your social activities and relationships? 3   In general, please rate how well you carry out your usual social activities and roles. (This includes activities at home, at work and in your community, and responsibilities as a parent, child, spouse, employee, friend, etc.) 3   To what extent are you able to carry out your everyday physical activities such as walking, climbing stairs, carrying groceries, or moving a chair? 5   In the past 7 days, how often have you been bothered by emotional problems such as feeling anxious, depressed, or irritable? 2   In the past 7 days, how would you rate your fatigue on average? 2   In the past 7 days, how would you rate your pain on average, where 0 means no pain, and 10 means worst imaginable pain? 5   Mental health question re-calculation - no clinical value 4   Physical health question re-calculation - no clinical value 4   Pain question re-calculation - no clinical value 3   Global Mental Health Score 12   Global Physical Health Score 16   PROMIS TOTAL - SUBSCORES 28       .    Patient Hx  Patient's history, medications and allergies were reviewed.     Height: 4' 9\"   Weight: 136 lbs 0 oz    Patient Active Problem List    Diagnosis Date Noted     Essential hypertension 08/17/2018     Priority: Medium      Prior to Admission medications    Medication Sig Start Date End Date Taking? Authorizing Provider   albuterol (PROAIR HFA/PROVENTIL HFA/VENTOLIN HFA) 108 (90 Base) MCG/ACT inhaler Inhale 2 puffs into the lungs every 6 hours as needed for shortness of breath / dyspnea or wheezing    Reported, Patient   amoxicillin-clavulanate (AUGMENTIN) 875-125 MG tablet Take 1 tablet by mouth 2 times daily 8/23/22   Evelina Rodriguez MD   benzonatate (TESSALON) 200 MG capsule Take 1 " capsule (200 mg) by mouth 3 times daily as needed for cough 4/26/22   Ila Garcia MD   budesonide-formoterol (SYMBICORT) 160-4.5 MCG/ACT Inhaler Inhale 2 puffs into the lungs 2 times daily    Reported, Patient   cetirizine (ZYRTEC) 10 MG tablet Take 10 mg by mouth daily    Reported, Patient   citalopram (CELEXA) 40 MG tablet Take 40 mg by mouth daily    Reported, Patient   EYE ITCH RELIEF 0.025 % ophthalmic solution PLACE 1 DROP IN EACH EYE 2 TIMES A DAY (EVERY 8 TO 12 HOURS) AS NEEDED 2/20/22   Reported, Patient   fish oil-omega-3 fatty acids 1000 MG capsule TAKE 1 PILL BY MOUTH TWICE DAILY/ TXHUA HNUB NOJ 1 LUB 2 [facility] ROJ 6/22/21   Reported, Patient   fluconazole (DIFLUCAN) 100 MG tablet Take 200mg(2 tablets) the first day, then take 100mg (1 tablet) for the remaining 9 days. 8/23/22   Evelina Rodriguez MD   ibuprofen (ADVIL/MOTRIN) 800 MG tablet Take 800 mg by mouth every 8 hours as needed for moderate pain    Reported, Patient   losartan (COZAAR) 100 MG tablet Take 100 mg by mouth daily    Reported, Patient   losartan (COZAAR) 50 MG tablet TAKE 1 PILL BY MOUTH DAILY FOR BLOOD PRESSURE / TXHUA HNUB NOJ 1 LUB [facility] SIAB 12/2/21   Reported, Patient   LUBRICATING PLUS EYE DROPS 0.5 % ophthalmic solution PF FORMULATION. PUT 1-2 DROPS IN EACH EYE AS NEEDED. DISCARD EACH VIAL AFTER SINGLE USE. 7/22/21   Reported, Patient   metoprolol succinate ER (TOPROL-XL) 100 MG 24 hr tablet TAKE 1 PILL BY MOUTH DAILY FOR BLOOD PRESSURE / TXHUA HNUB NOJ 1 LUB [facility] SIAB 3/15/22   Reported, Patient   NONFORMULARY OTC dry eyes drops as needed    Reported, Patient   nystatin (MYCOSTATIN) 962339 UNIT/ML suspension Take by mouth 4 times daily Take 1 teaspoonful by mouth 4 times daily for 10 days 8/23/22   Evelina Rodriguez MD   polyethylene glycol-propylene glycol (SYSTANE ULTRA) 0.4-0.3 % SOLN ophthalmic solution Place 1 drop into both eyes 4 times daily as needed for dry eyes     Reported, Patient   QUEtiapine (SEROQUEL) 50 MG tablet Take 50 mg by mouth At Bedtime    Reported, Patient   SYSTANE PRESERVATIVE FREE 0.4-0.3 % SOLN opthalmic solution PUT 1-2 DROPS IN EACH EYE 4 TIMES A DAY AS NEEDED. DISCARD EACH VIAL AFTER SINGLE USE 1/19/22   Reported, Patient   topiramate (TOPAMAX) 25 MG tablet TAKE 1 PILL BY MOUTH EVERY DAY FOR HEADACHE/TXHUA HNUB NOJ 1 LUB TSHUAJ PAB [facility] MELISSA GREGORIO *NEEDS TO BE SEEN* 3/2/22   Reported, Patient     No Known Allergies  No past medical history on file.  No past surgical history on file.  No family history on file.  Social History     Tobacco Use     Smoking status: Never Smoker     Smokeless tobacco: Never Used   Substance Use Topics     Alcohol use: Not on file        Pre-Procedure Education & Consent  Procedure education was provided to: Patient  Teaching method: Explanation    Barriers to learning: No Barrier    Patient indicated understanding of pre-procedure instruction and appropriate consent was obtained and documented.    ____________________________________________________________________    Post-Procedure Documentation: Esophageal Manometry    Manometry catheter was placed via right nare to 47 cm and normal saline swallows given per protocol. Manometry catheter was removed at the end of test.    Discharge instructions given to patient.    Notification of pending test results sent to provider for interpretation. Please reference scanned document for final interpretation of results. Patient will follow up with referring provider for test results.    Melida Colorado RN on 9/6/2022 at 8:57 AM

## 2022-09-08 ENCOUNTER — VIRTUAL VISIT (OUTPATIENT)
Dept: OTOLARYNGOLOGY | Facility: CLINIC | Age: 57
End: 2022-09-08
Payer: COMMERCIAL

## 2022-09-08 DIAGNOSIS — R05.3 CHRONIC COUGH: Primary | ICD-10-CM

## 2022-09-08 DIAGNOSIS — R49.0 DYSPHONIA: ICD-10-CM

## 2022-09-08 DIAGNOSIS — J38.7 IRRITABLE LARYNX SYNDROME: ICD-10-CM

## 2022-09-08 PROCEDURE — 92507 TX SP LANG VOICE COMM INDIV: CPT | Mod: GN | Performed by: SPEECH-LANGUAGE PATHOLOGIST

## 2022-09-08 NOTE — LETTER
9/8/2022      RE: Tucker Foreman  [address]  [place] [address]       [facility] VOICE CLINIC  VOICE / UPPER AIRWAY TREATMENT NOTE (CPT 76451)      Patient's name: Tucker Foreman  Date of Session: 9/8/2022  Providing SLP: Raine Nicole MS CCC-SLP  Referring Provider: Ila Garcia MD - Pulmonology; Evelina Rodriguez MD - Otolaryngology  Session #: 4  Session Location: Tucker was seen via telehealth today.   Additional Parties Present: ong , Nanda - her son, Dahiana    The patient has been notified and verbally consented to the following statements:     This video visit will be conducted between you and your provider.    Patient has opted to conduct today's video visit vs an in-person appointment, and is not able to attend due to possible exposure to COVID-19.      If during the course of the call the provider feels a video visit is not appropriate, you will not be charged for this service.     Provider has received verbal consent for billable virtual visit from the patient? Yes  Preferred method for receiving information: [employer]  Call initiated at: 2:05 PM  Call ended at: 2:22 PM  Platform used to conduct today's virtual appointment: Doximity  Location of provider: Residence  Location of patient: Residence       Impressions from most recent evaluation on 8/4/22 with Raine Nicole MS CCC-SLP:   Tucker is a 57-year-old female with chronic cough (R05.9) and subsequent dysphonia (R49.0) beginning in 1999 following ACE inhibitor use and irritable larynx syndrome (J38.7). Perceptually, her voice is mildly strained, and she has frequent 1-2 coughs that sound productive, as well as paroxysmal coughing not observed during today's session. Under laryngoscopy, her larynx demonstrated signs of laryngopharyngeal reflux (e.g. 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 Dr. Rodriguez's  recommendations are as follows:    Adjust GERD medications as needed    Schedule EGD    Communicate with PCP Dr. Lee to switch her blood pressure medications    Treat laryngeal candidiasis with nystatin, augmentin, and fluconazole    Recommend voice therapy for cough suppression        SUBJECTIVE:  Tucker continues to report significant improvements in her coughing after another round of medications with Dr. Rodriguez at her most recent visit. It has not completely resolved and is somewhat productive, but she is very happy with the reduction in her coughing. She is using cough suppression strategies and feels that they work okay. She recently saw Dr. Bautista with GI and had a probe placed per patient report (his note was not complete at the time of our visit today). She will be undergoing an EGD in October.       PATIENT REPORTED MEASURES:  Dysponia SLP Goals 8/9/2022 8/16/2022 9/8/2022   How severe is your cough /throat clearing, if 0 is no cough at all and 10 is the worst cough? 5 3 3       THERAPEUTIC ACTIVITIES:  Cough and throat clearing reduction strategies are a behavioral treatment to replace chronic coughing/throat clearing behaviors with multiple effortful swallows, thus reducing laryngeal hypersensitivity. These exercises were reviewed today, and Tucker performed them with high independent accuracy. No instances of coughing were observed during today's session, and she continues to note a reduction in cough severity and frequency.  No coughing during today's session      IMPRESSIONS:  Chronic cough (R05.9) and subsequent dysphonia (R49.0) beginning in 1999 following ACE inhibitor use and irritable larynx syndrome (J38.7)    Tucker continues to reports significant improvements in her coughing and productivity with antibiotic and antifungal treatments. She also uses cough suppression strategies independently. She is very happy with how much her coughing has reduced. She has a follow-up with Dr. Rodriguez in a few weeks, and  either myself or one of my SLP colleagues may be in attendance for this session if needed (e.g. increase in coughing or other upper airway problems). She will also continue her work-up with GI over the next several weeks.       PLAN:    Tucker will perform cough suppression techniques PRN between sessions     Tucker will continue to follow-up with Dr. Rodriguez and Dr. Bautista as they recommend    Tucker continues to demonstrate adequate progress toward long- and short-term goals.    Continued voice therapy services are recommended. Tucker will return for additional sessions in 2 months to ensure that she has maintained progress. Current goals will continue to be addressed.     Tucker is in agreement with this plan of care.       BILLING SUMMARY:    Total treatment time: 17 minutes    Speech Pathology Treatment ([id number])    No charge facility fee ([id number])      Raine Nicole MS CCC-SLP  Speech-Language Pathologist  [facility]  [email or web address]  [phone]      Raine Nicole, SLP

## 2022-09-08 NOTE — LETTER
9/8/2022       RE: Tucker Foreman  [address]  [place] [address]     Dear Colleague,    Thank you for referring your patient, Tucker Foreman, to the [facility] VOICE CLIN[facility] [place] [facility]. Please see a copy of my visit note below.    [facility]  VOICE / UPPER AIRWAY TREATMENT NOTE (CPT 33578)      Patient's name: Tucker Foreman  Date of Session: 9/8/2022  Providing SLP: Raine Nicole MS CCC-SLP  Referring Provider: Ila Garcia MD - Pulmonology; Evelina Rodriguez MD - Otolaryngology  Session #: 4  Session Location: Tucker was seen via telehealth today.   Additional Parties Present: [facility] , Nanda - her son, Dahiana    The patient has been notified and verbally consented to the following statements:     This video visit will be conducted between you and your provider.    Patient has opted to conduct today's video visit vs an in-person appointment, and is not able to attend due to possible exposure to COVID-19.      If during the course of the call the provider feels a video visit is not appropriate, you will not be charged for this service.     Provider has received verbal consent for billable virtual visit from the patient? Yes  Preferred method for receiving information: [place]  Call initiated at: 2:05 PM  Call ended at: 2:22 PM  Platform used to conduct today's virtual appointment: Doximity  Location of provider: Residence  Location of patient: Residence       Impressions from most recent evaluation on 8/4/22 with Raine Nicole MS CCC-SLP:   Tucker is a 57-year-old female with chronic cough (R05.9) and subsequent dysphonia (R49.0) beginning in 1999 following ACE inhibitor use and irritable larynx syndrome (J38.7). Perceptually, her voice is mildly strained, and she has frequent 1-2 coughs that sound productive, as well as paroxysmal coughing not observed during today's session. Under laryngoscopy, her larynx demonstrated signs of  laryngopharyngeal reflux (e.g. 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 Dr. Rodriguez's recommendations are as follows:    Adjust GERD medications as needed    Schedule EGD    Communicate with PCP Dr. Lee to switch her blood pressure medications    Treat laryngeal candidiasis with nystatin, augmentin, and fluconazole    Recommend voice therapy for cough suppression        SUBJECTIVE:  Tucker continues to report significant improvements in her coughing after another round of medications with Dr. Rodriguez at her most recent visit. It has not completely resolved and is somewhat productive, but she is very happy with the reduction in her coughing. She is using cough suppression strategies and feels that they work okay. She recently saw Dr. Bautista with GI and had a probe placed per patient report (his note was not complete at the time of our visit today). She will be undergoing an EGD in October.       PATIENT REPORTED MEASURES:  Dysponia SLP Goals 8/9/2022 8/16/2022 9/8/2022   How severe is your cough /throat clearing, if 0 is no cough at all and 10 is the worst cough? 5 3 3       THERAPEUTIC ACTIVITIES:  Cough and throat clearing reduction strategies are a behavioral treatment to replace chronic coughing/throat clearing behaviors with multiple effortful swallows, thus reducing laryngeal hypersensitivity. These exercises were reviewed today, and Tucker performed them with high independent accuracy. No instances of coughing were observed during today's session, and she continues to note a reduction in cough severity and frequency.  No coughing during today's session      IMPRESSIONS:  Chronic cough (R05.9) and subsequent dysphonia (R49.0) beginning in 1999 following ACE inhibitor use and irritable larynx syndrome (J38.7)    Tucker continues to reports significant improvements in her  coughing and productivity with antibiotic and antifungal treatments. She also uses cough suppression strategies independently. She is very happy with how much her coughing has reduced. She has a follow-up with Dr. Rodriguez in a few weeks, and either myself or one of my SLP colleagues may be in attendance for this session if needed (e.g. increase in coughing or other upper airway problems). She will also continue her work-up with GI over the next several weeks.       PLAN:    Tucker will perform cough suppression techniques PRN between sessions     Tucker will continue to follow-up with Dr. Rodriguez and Dr. Bautista as they recommend    Tucker continues to demonstrate adequate progress toward long- and short-term goals.    Continued voice therapy services are recommended. Tucker will return for additional sessions in 2 months to ensure that she has maintained progress. Current goals will continue to be addressed.     Tucker is in agreement with this plan of care.       BILLING SUMMARY:    Total treatment time: 17 minutes    Speech Pathology Treatment (87953)    No charge facility fee ([id number])      Raine Nicole MS CCC-SLP  Speech-Language Pathologist  [facility]  [email or web address]  [phone]      Again, thank you for allowing me to participate in the care of your patient.      Sincerely,    Raine Nicole, VIOLET

## 2022-09-08 NOTE — PROGRESS NOTES
[facility] VOICE CLINIC  VOICE / UPPER AIRWAY TREATMENT NOTE (CPT 79700)      Patient's name: Tucker Foreman  Date of Session: 9/8/2022  Providing SLP: Raine Nicole MS CCC-SLP  Referring Provider: Ila Garcia MD - Pulmonology; Evelina Rodriguez MD - Otolaryngology  Session #: 4  Session Location: Tucker was seen via telehealth today.   Additional Parties Present: Elissa , Nanda - her son, Dahiana    The patient has been notified and verbally consented to the following statements:     This video visit will be conducted between you and your provider.    Patient has opted to conduct today's video visit vs an in-person appointment, and is not able to attend due to possible exposure to COVID-19.      If during the course of the call the provider feels a video visit is not appropriate, you will not be charged for this service.     Provider has received verbal consent for billable virtual visit from the patient? Yes  Preferred method for receiving information: Morphy  Call initiated at: 2:05 PM  Call ended at: 2:22 PM  Platform used to conduct today's virtual appointment: Doximity  Location of provider: Residence  Location of patient: Residence       Impressions from most recent evaluation on 8/4/22 with Raine Nicole MS CCC-SLP:   Tucker is a 57-year-old female with chronic cough (R05.9) and subsequent dysphonia (R49.0) beginning in 1999 following ACE inhibitor use and irritable larynx syndrome (J38.7). Perceptually, her voice is mildly strained, and she has frequent 1-2 coughs that sound productive, as well as paroxysmal coughing not observed during today's session. Under laryngoscopy, her larynx demonstrated signs of laryngopharyngeal reflux (e.g. 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 Dr. Rodriguez's recommendations are as follows:    Adjust GERD medications as  needed    Schedule EGD    Communicate with PCP Dr. Lee to switch her blood pressure medications    Treat laryngeal candidiasis with nystatin, augmentin, and fluconazole    Recommend voice therapy for cough suppression        SUBJECTIVE:  Tucker continues to report significant improvements in her coughing after another round of medications with Dr. Rodriguez at her most recent visit. It has not completely resolved and is somewhat productive, but she is very happy with the reduction in her coughing. She is using cough suppression strategies and feels that they work okay. She recently saw Dr. Bautista with GI and had a probe placed per patient report (his note was not complete at the time of our visit today). She will be undergoing an EGD in October.       PATIENT REPORTED MEASURES:  Dysponia SLP Goals 8/9/2022 8/16/2022 9/8/2022   How severe is your cough /throat clearing, if 0 is no cough at all and 10 is the worst cough? 5 3 3       THERAPEUTIC ACTIVITIES:  Cough and throat clearing reduction strategies are a behavioral treatment to replace chronic coughing/throat clearing behaviors with multiple effortful swallows, thus reducing laryngeal hypersensitivity. These exercises were reviewed today, and Tucker performed them with high independent accuracy. No instances of coughing were observed during today's session, and she continues to note a reduction in cough severity and frequency.  No coughing during today's session      IMPRESSIONS:  Chronic cough (R05.9) and subsequent dysphonia (R49.0) beginning in 1999 following ACE inhibitor use and irritable larynx syndrome (J38.7)    Tucker continues to reports significant improvements in her coughing and productivity with antibiotic and antifungal treatments. She also uses cough suppression strategies independently. She is very happy with how much her coughing has reduced. She has a follow-up with Dr. Rodriguez in a few weeks, and either myself or one of my SLP colleagues may be in attendance  for this session if needed (e.g. increase in coughing or other upper airway problems). She will also continue her work-up with GI over the next several weeks.       PLAN:    Tucker will perform cough suppression techniques PRN between sessions     Tucker will continue to follow-up with Dr. Rodriguez and Dr. Bautista as they recommend    Tucker continues to demonstrate adequate progress toward long- and short-term goals.    Continued voice therapy services are recommended. Tucker will return for additional sessions in 2 months to ensure that she has maintained progress. Current goals will continue to be addressed.     Tucker is in agreement with this plan of care.       BILLING SUMMARY:    Total treatment time: 17 minutes    Speech Pathology Treatment (68706)    No charge facility fee ([id number])      Raine Nicole MS CCC-SLP  Speech-Language Pathologist  [facility]  [email or web address]  [phone]

## 2022-09-30 ENCOUNTER — PATIENT OUTREACH (OUTPATIENT)
Dept: GASTROENTEROLOGY | Facility: CLINIC | Age: 57
End: 2022-09-30

## 2022-09-30 NOTE — TELEPHONE ENCOUNTER
Tried calling pt with  about upcoming endoscopy with [facility] testing.  left message for pt notifying them about the letter with instructions and the writer's phone number. Sent letter with instructions in the mail to pt.

## 2022-10-04 ENCOUNTER — OFFICE VISIT (OUTPATIENT)
Dept: OTOLARYNGOLOGY | Facility: CLINIC | Age: 57
End: 2022-10-04
Payer: COMMERCIAL

## 2022-10-04 VITALS
HEIGHT: 59 IN | OXYGEN SATURATION: 98 % | HEART RATE: 75 BPM | BODY MASS INDEX: 24.68 KG/M2 | WEIGHT: 122.4 LBS | SYSTOLIC BLOOD PRESSURE: 124 MMHG | DIASTOLIC BLOOD PRESSURE: 84 MMHG

## 2022-10-04 DIAGNOSIS — R05.3 CHRONIC COUGH: Primary | ICD-10-CM

## 2022-10-04 PROCEDURE — 99213 OFFICE O/P EST LOW 20 MIN: CPT | Mod: 25 | Performed by: OTOLARYNGOLOGY

## 2022-10-04 PROCEDURE — 31575 DIAGNOSTIC LARYNGOSCOPY: CPT | Performed by: OTOLARYNGOLOGY

## 2022-10-04 RX ORDER — AMLODIPINE BESYLATE 10 MG/1
TABLET ORAL
COMMUNITY
Start: 2022-09-14

## 2022-10-04 RX ORDER — ATORVASTATIN CALCIUM 20 MG/1
TABLET, FILM COATED ORAL
COMMUNITY
Start: 2022-07-18

## 2022-10-04 ASSESSMENT — PAIN SCALES - GENERAL: PAINLEVEL: NO PAIN (0)

## 2022-10-04 NOTE — PATIENT INSTRUCTIONS
"You were seen in the clinic today by Dr. Rodriguez. If you have any questions or concerns after your appointment, please call the clinic at [phone]. Press \"1\" for scheduling, press \"3\" for nurse advice.    2.   Please make an appointment with VIOLET Ni for an IN person session. Can be before or after the current virtual one that is scheduled. Plan to follow up with Dr. Rodriguez after completion of in-person session with Raine.        Bobbi Field, RN, RNMissouri Southern Healthcare  Department of Otolaryngology  [phone]    "

## 2022-10-04 NOTE — PROGRESS NOTES
[facility]   at the [facility]   Otolaryngology Clinic     Patient: Tucker Foreman    MRN: [id number]    : 1965    Age/Gender: 57 year old female  Date of Service: 10/4/2022  Rendering Provider:   Evelina Rodriguez MD     Chief Complaint   Chronic cough  Interval History   HISTORY OF PRESENT ILLNESS: Ms. Foreman is a 57 year old female is being followed for chronic cough. she was initially seen on 22. Please refer to this note for full history.     Of note, she has a history of GERD on daily PPI and no history of asthma. Her preferred language is Hmong.    Today, she presents for follow up. She reports:  - completion of prescribed medications  - condition is unchanged  - coughing a lot now, unsure of number  - has to get up to cough throughout night, constant  - unable to seep  - coughs more at night than during day  - during day time, there are periods of time where she isn't coughing  - using exercises learned, but cough is still being triggered   - drinks water after cough  - also does breathing technique, but feels it makes things worse  - produces clear, sticky mucus, but has decreased in quantity  - voice is unchanged  - she likes her voice  - has no preference for virtual versus in person speech path visits  - still taking reflux medication, changed hypertension med as requested       PAST MEDICAL HISTORY: No past medical history on file.    PAST SURGICAL HISTORY: No past surgical history on file.    CURRENT MEDICATIONS:   Current Outpatient Medications:      albuterol (PROAIR HFA/PROVENTIL HFA/VENTOLIN HFA) 108 (90 Base) MCG/ACT inhaler, Inhale 2 puffs into the lungs every 6 hours as needed for shortness of breath / dyspnea or wheezing, Disp: , Rfl:      amoxicillin-clavulanate (AUGMENTIN) 875-125 MG tablet, Take 1 tablet by mouth 2 times daily, Disp: 28 tablet, Rfl: 0     benzonatate (TESSALON) 200 MG capsule, Take 1 capsule (200 mg) by mouth 3 times daily as needed for cough,  Disp: 90 capsule, Rfl: 4     budesonide-formoterol (SYMBICORT) 160-4.5 MCG/ACT Inhaler, Inhale 2 puffs into the lungs 2 times daily, Disp: , Rfl:      cetirizine (ZYRTEC) 10 MG tablet, Take 10 mg by mouth daily, Disp: , Rfl:      citalopram (CELEXA) 40 MG tablet, Take 40 mg by mouth daily, Disp: , Rfl:      EYE ITCH RELIEF 0.025 % ophthalmic solution, PLACE 1 DROP IN EACH EYE 2 TIMES A DAY (EVERY 8 TO 12 HOURS) AS NEEDED, Disp: , Rfl:      fish oil-omega-3 fatty acids 1000 MG capsule, TAKE 1 PILL BY MOUTH TWICE DAILY/ TXHUA HNUB NOJ 1 LUB 2 [facility] RO, Disp: , Rfl:      fluconazole (DIFLUCAN) 100 MG tablet, Take 200mg(2 tablets) the first day, then take 100mg (1 tablet) for the remaining 9 days., Disp: 11 tablet, Rfl: 0     ibuprofen (ADVIL/MOTRIN) 800 MG tablet, Take 800 mg by mouth every 8 hours as needed for moderate pain, Disp: , Rfl:      losartan (COZAAR) 100 MG tablet, Take 100 mg by mouth daily, Disp: , Rfl:      losartan (COZAAR) 50 MG tablet, TAKE 1 PILL BY MOUTH DAILY FOR BLOOD PRESSURE / TXHUA HNUB NOJ 1 LUB [facility] SIAB, Disp: , Rfl:      LUBRICATING PLUS EYE DROPS 0.5 % ophthalmic solution, PF FORMULATION. PUT 1-2 DROPS IN EACH EYE AS NEEDED. DISCARD EACH VIAL AFTER SINGLE USE., Disp: , Rfl:      metoprolol succinate ER (TOPROL-XL) 100 MG 24 hr tablet, TAKE 1 PILL BY MOUTH DAILY FOR BLOOD PRESSURE / TXHUA HNUB NOJ 1 LUB [facility] SIAB, Disp: , Rfl:      NONFORMULARY, OTC dry eyes drops as needed, Disp: , Rfl:      nystatin (MYCOSTATIN) 546456 UNIT/ML suspension, Take by mouth 4 times daily Take 1 teaspoonful by mouth 4 times daily for 10 days, Disp: 280 mL, Rfl: 0     polyethylene glycol-propylene glycol (SYSTANE ULTRA) 0.4-0.3 % SOLN ophthalmic solution, Place 1 drop into both eyes 4 times daily as needed for dry eyes, Disp: , Rfl:      QUEtiapine (SEROQUEL) 50 MG tablet, Take 50 mg by mouth At Bedtime, Disp: , Rfl:      SYSTANE PRESERVATIVE FREE 0.4-0.3  % SOLN opthalmic solution, PUT 1-2 DROPS IN EACH EYE 4 TIMES A DAY AS NEEDED. DISCARD EACH VIAL AFTER SINGLE USE, Disp: , Rfl:      topiramate (TOPAMAX) 25 MG tablet, TAKE 1 PILL BY MOUTH EVERY DAY FOR HEADACHE/TXHUA HNUB NOJ 1 LUB TSHUAJ PAB [facility] MELISSA GREGORIO *NEEDS TO BE SEEN*, Disp: , Rfl:     ALLERGIES: Patient has no known allergies.    SOCIAL HISTORY:    Social History     Socioeconomic History     Marital status: Single     Spouse name: Not on file     Number of children: Not on file     Years of education: Not on file     Highest education level: Not on file   Occupational History     Not on file   Tobacco Use     Smoking status: Never Smoker     Smokeless tobacco: Never Used   Substance and Sexual Activity     Alcohol use: Not on file     Drug use: Not on file     Sexual activity: Not on file   Other Topics Concern     Not on file   Social History Narrative     Not on file     Social Determinants of Health     Financial Resource Strain: Not on file   Food Insecurity: Not on file   Transportation Needs: Not on file   Physical Activity: Not on file   Stress: Not on file   Social Connections: Not on file   Intimate Partner Violence: Not At Risk     Fear of Current or Ex-Partner: No     Emotionally Abused: No     Physically Abused: No     Sexually Abused: No   Housing Stability: Not on file         FAMILY HISTORY: No family history on file.   Non-contributory for problems with anesthesia    REVIEW OF SYSTEMS:   The patient was asked a 14 point review of systems regarding constitutional symptoms, eye symptoms, ears, nose, mouth, throat symptoms, cardiovascular symptoms, respiratory symptoms, gastrointestinal symptoms, genitourinary symptoms, musculoskeletal symptoms, integumentary symptoms, neurological symptoms, psychiatric symptoms, endocrine symptoms, hematologic/lymphatic symptoms, and allergic/ immunologic symptoms.   The pertinent factors have been included in the HPI and below.  Patient Supplied Answers to  Review of Systems  No flowsheet data found.    Physical Examination   The patient underwent a physical examination as described below. The pertinent positive and negative findings are summarized after the description of the examination.  Constitutional: The patient's developmental and nutritional status was assessed. The patient's voice quality was assessed.  Head and Face: The head and face were inspected for deformities. The sinuses were palpated. The salivary glands were palpated. Facial muscle strength was assessed bilaterally.  Eyes: Extraocular movements and primary gaze alignment were assessed.  Ears, Nose, Mouth and Throat: The ears and nose were examined for deformities. The nasal septum, mucosa, and turbinates were inspected by anterior rhinoscopy. The lips, teeth, and gums were examined for abnormalities. The oral mucosa, tongue, palate, tonsils, lateral and posterior pharynx were inspected for the presence of asymmetry or mucosal lesions.    Neck: The tracheal position was noted, and the neck mass palpated to determine if there were any asymmetries, abnormal neck masses, thyromegally, or thyroid nodules.  Respiratory: The nature of the breathing and chest expansion/symmetry was observed.  Cardiovascular: The patient was examined to determine the presence of any edema or jugular venous distension.  Abdomen: The contour of the abdomen was noted.  Lymphatic: The patient was examined for infraclavicular lymphadenopathy.  Musculoskeletal: The patient was inspected for the presence of skeletal deformities.  Extremities: The extremities were examined for any clubbing or cyanosis.  Skin: The skin was examined for inflammatory or neoplastic conditions.  Neurologic: The patient's orientation, mood, and affect were noted. The cranial nerve  functions were examined.  Other pertinent positive and negative findings on physical examination:   OC/OP: no lesions, uvula midline, soft palate elevates symmetrically   Neck:  no lesions, no TH tenderness to palpation  All other physical examination findings were within normal limits and noncontributory.    Procedures   Flexible laryngoscopy (CPT 93327)      Pre-procedure diagnosis: chronic cough  Post-procedure diagnosis: same as above  Indication for procedure: Ms. Foreman is a 57 year old female with see above  Procedure(s): Fiberoptic Laryngoscopy    Details of Procedure: After informed consent was obtained, the patient was seated in the examination chair.  The areas of the nasopharynx as well as the hypopharynx were anesthetized with topical 4% lidocaine with 0.25% phenylephrine atomizer.  Examination of the base of tongue was performed first.  Attention was directed to any evidence of masses in the area or evidence of leukoplakia or candidal infection.  Attention was directed to the epiglottis where its size and position was determined and its movement on phonation of the vowel  e .  The piriform sinuses were then inspected for any mass lesions or pooling of secretions.  Attention was then directed to the larynx. The vocal folds were inspected for infection or any areas of leukoplakia, for masses, polypoid degeneration, or hemorrhage.  Having done this, the arytenoids and vocal processes were inspected for erythema or evidence of granuloma formation.  The posterior commissure was then inspected for evidence of inflammatory changes in the mucosa and heaping up of mucosal tissue. The patient was then instructed to say the vowel  e .  Adduction of vocal folds to the midline was observed for any evidence of paresis or paralysis of the larynx or asymmetry in rotation of the larynx to the left or right. The patient was asked to breathe and the degree of abduction was noted bilaterally.  Subglottic view of the larynx was obtained for any additional mass lesions or mucosal changes.  Finally the post cricoid was examined for evidence of pooling of secretions, as well as the pharyngeal wall  mucosa.   Anesthesia type: 0.25% phenylephrine    Findings:  Anatomic/physiological deviations: LNC, resolved left vocal fold redness, postcricoid edema present, persistent changes    Right vocal process: No restriction of mobility   Left vocal process: No restriction of mobility  Glottal gap: Complete glottal closure  Supraglottic structures: Normal  Hypopharynx: Normal     Estimated Blood Loss: minimal  Complications: None  Disposition: Patient tolerated the procedure well                   Review of Relevant Clinical Data   I personally reviewed:  Notes:   9/08/22, Raine Nicole, SLP    Tucker continues to report significant improvements in her coughing after another round of medications with Dr. Rodriguez at her most recent visit. It has not completely resolved and is somewhat productive, but she is very happy with the reduction in her coughing. She is using cough suppression strategies and feels that they work okay. She recently saw Dr. Bautista with GI and had a probe placed per patient report (his note was not complete at the time of our visit today). She will be undergoing an EGD in October.    Impression:  Tucker is a 57-year-old female with chronic cough (R05.9) and subsequent dysphonia (R49.0) beginning in 1999 following ACE inhibitor use and irritable larynx syndrome (J38.7). Perceptually, her voice is mildly strained, and she has frequent 1-2 coughs that sound productive, as well as paroxysmal coughing not observed during today's session. Under laryngoscopy, her larynx demonstrated signs of laryngopharyngeal reflux (e.g. 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 Dr. Rodriguez's recommendations are as follows:    Adjust GERD medications as needed    Schedule EGD    Communicate with PCP Dr. Lee to switch her blood pressure medications    Treat laryngeal candidiasis with  nystatin, augmentin, and fluconazole    Recommend voice therapy for cough suppression     Procedures:   22, Esophageal Manometry  Pending results    Labs:  No results found for: TSH  No results found for: NA, CO2, BUN, CREAT, GLUCOSE, PHOS  Lab Results   Component Value Date    HGB 14.5 03/10/2022     No results found for: PT, PTT, INR  No results found for: HARVEY  No components found for: RHEUMATOIDFACTOR,  RF  No results found for: CRP  No components found for: CKTOT, URICACID  No components found for: C3, C4, DSDNAAB, NDNAABIFA  No results found for: MPOAB    Patient reported Quality of Life (QOL) Measures   Patient Supplied Answers To VHI Questionnaire  No flowsheet data found.      Patient Supplied Answers To EAT Questionnaire  No flowsheet data found.      Patient Supplied Answers To CSI Questionnaire  No flowsheet data found.      Patient Supplied Answers to Dyspnea Index Questionnaire:  No flowsheet data found.    Impression & Plan     IMPRESSION: Ms. Foreman is a 57 year old female who is being seen for the followin.         Chronic cough  - started in  in the setting of blood pressure medication   - never used cigarettes  - chest xray  - clear  - on losartan  - allergy triggers and work up: denies  - pulmonary triggers and work up: PFTs 3/10/22 - normal, referred by Dr Garcia, tried inhalers without benefit  - GI triggers and work up: does feel reflux, was told she might have an ulcer but she does not have an EGD, was given reflux meds by her primary, she can't sleep at night due to cough, cough is worse in the morning   - ENT triggers and work up: worse with talking  - scope shows post cricoid ededma, vocal fold with mild erythema with question of fungal laryngitis  - FEES shows safe swallow   - symptoms likely due to ARB BP medication, reflux etiology and irritable larynx syndrome, also has vocal fold erythema so will treat for possible bacterial or fungal laryngitis given history of  steroid   - will treat for reflux and obtain esophageal work up   - needs to come off ARB since it can contribute to her cough  - symptoms 8/17/2022 are greatly improved after course of Augmentin, nystatin, and fluconazole as well as 2 cough suppression therapy sessions   - she has had her blood pressure medication changed since our last appointment as well  - she is on PPI BID   - scope shows new left true vocal fold redness, resolved pharyngeal redness  - the new left true vocal fold redness is new and likely due to coughing and will treat with another round of medications given restriction of vocal fold vibration bilaterally   - symptoms 10/4/2022 are increased cough, especially at night, with production of clear, sticky mucus, voice unchanged  - strobe shows resolved left vocal fold redness, postcricoid edema present, persistent changes over the vocal folds that are stable now  - symptoms are likely due to esophageal etiology given nighttime cough - undergoing work up now  - not on steroid inhaler anymore - vocal fold redness is resolved but vocal folds themselves have been having a stable phonotraumatic appearance  - sipping water is the most helpful cough suppression technique, does report knowing other ones like breathing but when asked to mimic breathing she proceeded to show panting with high supraclavicular breathes and reported that breathing technique does not work  - discussed proceeding with one in person session of therapy to work on breathing technique and demonstration of good suppression technique  Plan  - additional in person session with Raine Nicole, SLP to improve breathing technique  - upcoming EDG with pH testing with Dr. Bautista on 10/13, follow up on esophageal testing  - follow up after completed sessions with SLP to see further medical treatment at that point and after complete esophageal work up    RETURN VISIT: follow up few weeks after speech therapy    Evelina Rodriguez  MD    Laryngology    [facility]  Department of  Otolaryngology - Head and Neck Surgery  Clinics & Surgery Center  [address] [place] [address]  Appointment line: [phone]  Fax: [phone]  [email or web address]     Scribe Disclosure:  Gisela OROZCO, am serving as a scribe to document services personally performed by Evelina Rodriguez MD at this visit, based upon the provider's statements to me. All documentation has been reviewed by the aforementioned provider prior to being entered into the official medical record.

## 2022-10-04 NOTE — LETTER
10/4/2022       RE: Tucker Foreman  [address]  [place] [address]     Dear Colleague,    Thank you for referring your patient, Tucker Foreman, to the [facility] EAR NOSE AND TH[facility]AT [facility]IC [place] at [facility]. Please see a copy of my visit note below.        [facility]   at the [facility]   Otolaryngology Clinic     Patient: Tucker Foreman    MRN: [id number]    : 1965    Age/Gender: 57 year old female  Date of Service: 10/4/2022  Rendering Provider:   Evelina Rodriguez MD     Chief Complaint   Chronic cough  Interval History   HISTORY OF PRESENT ILLNESS: Ms. Foreman is a 57 year old female is being followed for chronic cough. she was initially seen on 22. Please refer to this note for full history.     Of note, she has a history of GERD on daily PPI and no history of asthma. Her preferred language is Hmong.    Today, she presents for follow up. She reports:  - completion of prescribed medications  - condition is unchanged  - coughing a lot now, unsure of number  - has to get up to cough throughout night, constant  - unable to seep  - coughs more at night than during day  - during day time, there are periods of time where she isn't coughing  - using exercises learned, but cough is still being triggered   - drinks water after cough  - also does breathing technique, but feels it makes things worse  - produces clear, sticky mucus, but has decreased in quantity  - voice is unchanged  - she likes her voice  - has no preference for virtual versus in person speech path visits  - still taking reflux medication, changed hypertension med as requested       PAST MEDICAL HISTORY: No past medical history on file.    PAST SURGICAL HISTORY: No past surgical history on file.    CURRENT MEDICATIONS:   Current Outpatient Medications:      albuterol (PROAIR HFA/PROVENTIL HFA/VENTOLIN HFA) 108 (90 Base) MCG/ACT inhaler, Inhale 2 puffs into the lungs  every 6 hours as needed for shortness of breath / dyspnea or wheezing, Disp: , Rfl:      amoxicillin-clavulanate (AUGMENTIN) 875-125 MG tablet, Take 1 tablet by mouth 2 times daily, Disp: 28 tablet, Rfl: 0     benzonatate (TESSALON) 200 MG capsule, Take 1 capsule (200 mg) by mouth 3 times daily as needed for cough, Disp: 90 capsule, Rfl: 4     budesonide-formoterol (SYMBICORT) 160-4.5 MCG/ACT Inhaler, Inhale 2 puffs into the lungs 2 times daily, Disp: , Rfl:      cetirizine (ZYRTEC) 10 MG tablet, Take 10 mg by mouth daily, Disp: , Rfl:      citalopram (CELEXA) 40 MG tablet, Take 40 mg by mouth daily, Disp: , Rfl:      EYE ITCH RELIEF 0.025 % ophthalmic solution, PLACE 1 DROP IN EACH EYE 2 TIMES A DAY (EVERY 8 TO 12 HOURS) AS NEEDED, Disp: , Rfl:      fish oil-omega-3 fatty acids 1000 MG capsule, TAKE 1 PILL BY MOUTH TWICE DAILY/ TXHUA HNUB NOJ 1 LUB 2 [facility], Disp: , Rfl:      fluconazole (DIFLUCAN) 100 MG tablet, Take 200mg(2 tablets) the first day, then take 100mg (1 tablet) for the remaining 9 days., Disp: 11 tablet, Rfl: 0     ibuprofen (ADVIL/MOTRIN) 800 MG tablet, Take 800 mg by mouth every 8 hours as needed for moderate pain, Disp: , Rfl:      losartan (COZAAR) 100 MG tablet, Take 100 mg by mouth daily, Disp: , Rfl:      losartan (COZAAR) 50 MG tablet, TAKE 1 PILL BY MOUTH DAILY FOR BLOOD PRESSURE / TXHUA HNUB NOJ 1 LUB [facility], Disp: , Rfl:      LUBRICATING PLUS EYE DROPS 0.5 % ophthalmic solution, PF FORMULATION. PUT 1-2 DROPS IN EACH EYE AS NEEDED. DISCARD EACH VIAL AFTER SINGLE USE., Disp: , Rfl:      metoprolol succinate ER (TOPROL-XL) 100 MG 24 hr tablet, TAKE 1 PILL BY MOUTH DAILY FOR BLOOD PRESSURE / TXHUA HNUB NOJ 1 LUB [facility], Disp: , Rfl:      NONFORMULARY, OTC dry eyes drops as needed, Disp: , Rfl:      nystatin (MYCOSTATIN) 159721 UNIT/ML suspension, Take by mouth 4 times daily Take 1 teaspoonful by mouth 4 times daily for 10 days,  Disp: 280 mL, Rfl: 0     polyethylene glycol-propylene glycol (SYSTANE ULTRA) 0.4-0.3 % SOLN ophthalmic solution, Place 1 drop into both eyes 4 times daily as needed for dry eyes, Disp: , Rfl:      QUEtiapine (SEROQUEL) 50 MG tablet, Take 50 mg by mouth At Bedtime, Disp: , Rfl:      SYSTANE PRESERVATIVE FREE 0.4-0.3 % SOLN opthalmic solution, PUT 1-2 DROPS IN EACH EYE 4 TIMES A DAY AS NEEDED. DISCARD EACH VIAL AFTER SINGLE USE, Disp: , Rfl:      topiramate (TOPAMAX) 25 MG tablet, TAKE 1 PILL BY MOUTH EVERY DAY FOR HEADACHE/TXHUA HNUB NOJ 1 LUB TSHUAJ [facility] MELISSA GREGORIO *NEEDS TO BE SEEN*, Disp: , Rfl:     ALLERGIES: Patient has no known allergies.    SOCIAL HISTORY:    Social History     Socioeconomic History     Marital status: Single     Spouse name: Not on file     Number of children: Not on file     Years of education: Not on file     Highest education level: Not on file   Occupational History     Not on file   Tobacco Use     Smoking status: Never Smoker     Smokeless tobacco: Never Used   Substance and Sexual Activity     Alcohol use: Not on file     Drug use: Not on file     Sexual activity: Not on file   Other Topics Concern     Not on file   Social History Narrative     Not on file     Social Determinants of Health     Financial Resource Strain: Not on file   Food Insecurity: Not on file   Transportation Needs: Not on file   Physical Activity: Not on file   Stress: Not on file   Social Connections: Not on file   Intimate Partner Violence: Not At Risk     Fear of Current or Ex-Partner: No     Emotionally Abused: No     Physically Abused: No     Sexually Abused: No   Housing Stability: Not on file         FAMILY HISTORY: No family history on file.   Non-contributory for problems with anesthesia    REVIEW OF SYSTEMS:   The patient was asked a 14 point review of systems regarding constitutional symptoms, eye symptoms, ears, nose, mouth, throat symptoms, cardiovascular symptoms, respiratory symptoms,  gastrointestinal symptoms, genitourinary symptoms, musculoskeletal symptoms, integumentary symptoms, neurological symptoms, psychiatric symptoms, endocrine symptoms, hematologic/lymphatic symptoms, and allergic/ immunologic symptoms.   The pertinent factors have been included in the HPI and below.  Patient Supplied Answers to Review of Systems  No flowsheet data found.    Physical Examination   The patient underwent a physical examination as described below. The pertinent positive and negative findings are summarized after the description of the examination.  Constitutional: The patient's developmental and nutritional status was assessed. The patient's voice quality was assessed.  Head and Face: The head and face were inspected for deformities. The sinuses were palpated. The salivary glands were palpated. Facial muscle strength was assessed bilaterally.  Eyes: Extraocular movements and primary gaze alignment were assessed.  Ears, Nose, Mouth and Throat: The ears and nose were examined for deformities. The nasal septum, mucosa, and turbinates were inspected by anterior rhinoscopy. The lips, teeth, and gums were examined for abnormalities. The oral mucosa, tongue, palate, tonsils, lateral and posterior pharynx were inspected for the presence of asymmetry or mucosal lesions.    Neck: The tracheal position was noted, and the neck mass palpated to determine if there were any asymmetries, abnormal neck masses, thyromegally, or thyroid nodules.  Respiratory: The nature of the breathing and chest expansion/symmetry was observed.  Cardiovascular: The patient was examined to determine the presence of any edema or jugular venous distension.  Abdomen: The contour of the abdomen was noted.  Lymphatic: The patient was examined for infraclavicular lymphadenopathy.  Musculoskeletal: The patient was inspected for the presence of skeletal deformities.  Extremities: The extremities were examined for any clubbing or cyanosis.  Skin:  The skin was examined for inflammatory or neoplastic conditions.  Neurologic: The patient's orientation, mood, and affect were noted. The cranial nerve  functions were examined.  Other pertinent positive and negative findings on physical examination:   OC/OP: no lesions, uvula midline, soft palate elevates symmetrically   Neck: no lesions, no TH tenderness to palpation  All other physical examination findings were within normal limits and noncontributory.    Procedures   Flexible laryngoscopy (CPT 83367)      Pre-procedure diagnosis: chronic cough  Post-procedure diagnosis: same as above  Indication for procedure: Ms. Foreman is a 57 year old female with see above  Procedure(s): Fiberoptic Laryngoscopy    Details of Procedure: After informed consent was obtained, the patient was seated in the examination chair.  The areas of the nasopharynx as well as the hypopharynx were anesthetized with topical 4% lidocaine with 0.25% phenylephrine atomizer.  Examination of the base of tongue was performed first.  Attention was directed to any evidence of masses in the area or evidence of leukoplakia or candidal infection.  Attention was directed to the epiglottis where its size and position was determined and its movement on phonation of the vowel  e .  The piriform sinuses were then inspected for any mass lesions or pooling of secretions.  Attention was then directed to the larynx. The vocal folds were inspected for infection or any areas of leukoplakia, for masses, polypoid degeneration, or hemorrhage.  Having done this, the arytenoids and vocal processes were inspected for erythema or evidence of granuloma formation.  The posterior commissure was then inspected for evidence of inflammatory changes in the mucosa and heaping up of mucosal tissue. The patient was then instructed to say the vowel  e .  Adduction of vocal folds to the midline was observed for any evidence of paresis or paralysis of the larynx or asymmetry in  rotation of the larynx to the left or right. The patient was asked to breathe and the degree of abduction was noted bilaterally.  Subglottic view of the larynx was obtained for any additional mass lesions or mucosal changes.  Finally the post cricoid was examined for evidence of pooling of secretions, as well as the pharyngeal wall mucosa.   Anesthesia type: 0.25% phenylephrine    Findings:  Anatomic/physiological deviations: LNC, resolved left vocal fold redness, postcricoid edema present, persistent changes    Right vocal process: No restriction of mobility   Left vocal process: No restriction of mobility  Glottal gap: Complete glottal closure  Supraglottic structures: Normal  Hypopharynx: Normal     Estimated Blood Loss: minimal  Complications: None  Disposition: Patient tolerated the procedure well                   Review of Relevant Clinical Data   I personally reviewed:  Notes:   9/08/22, Raine Nicole, SLP    Tucker continues to report significant improvements in her coughing after another round of medications with Dr. Rodriguez at her most recent visit. It has not completely resolved and is somewhat productive, but she is very happy with the reduction in her coughing. She is using cough suppression strategies and feels that they work okay. She recently saw Dr. Bautista with GI and had a probe placed per patient report (his note was not complete at the time of our visit today). She will be undergoing an EGD in October.    Impression:  Tucker is a 57-year-old female with chronic cough (R05.9) and subsequent dysphonia (R49.0) beginning in 1999 following ACE inhibitor use and irritable larynx syndrome (J38.7). Perceptually, her voice is mildly strained, and she has frequent 1-2 coughs that sound productive, as well as paroxysmal coughing not observed during today's session. Under laryngoscopy, her larynx demonstrated signs of laryngopharyngeal reflux (e.g. 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 Dr. Rodriguez's recommendations are as follows:    Adjust GERD medications as needed    Schedule EGD    Communicate with PCP Dr. Lee to switch her blood pressure medications    Treat laryngeal candidiasis with nystatin, augmentin, and fluconazole    Recommend voice therapy for cough suppression     Procedures:   22, Esophageal Manometry  Pending results    Labs:  No results found for: TSH  No results found for: NA, CO2, BUN, CREAT, GLUCOSE, PHOS  Lab Results   Component Value Date    HGB 14.5 03/10/2022     No results found for: PT, PTT, INR  No results found for: HARVEY  No components found for: RHEUMATOIDFACTOR,  RF  No results found for: CRP  No components found for: CKTOT, URICACID  No components found for: C3, C4, DSDNAAB, NDNAABIFA  No results found for: MPOAB    Patient reported Quality of Life (QOL) Measures   Patient Supplied Answers To VHI Questionnaire  No flowsheet data found.      Patient Supplied Answers To EAT Questionnaire  No flowsheet data found.      Patient Supplied Answers To CSI Questionnaire  No flowsheet data found.      Patient Supplied Answers to Dyspnea Index Questionnaire:  No flowsheet data found.    Impression & Plan     IMPRESSION: Ms. Foreman is a 57 year old female who is being seen for the followin.         Chronic cough  - started in  in the setting of blood pressure medication   - never used cigarettes  - chest xray  - clear  - on losartan  - allergy triggers and work up: denies  - pulmonary triggers and work up: PFTs 3/10/22 - normal, referred by Dr Garcia, tried inhalers without benefit  - GI triggers and work up: does feel reflux, was told she might have an ulcer but she does not have an EGD, was given reflux meds by her primary, she can't sleep at night due to cough, cough is worse in the morning   - ENT triggers and work up: worse with  talking  - scope shows post cricoid ededma, vocal fold with mild erythema with question of fungal laryngitis  - FEES shows safe swallow   - symptoms likely due to ARB BP medication, reflux etiology and irritable larynx syndrome, also has vocal fold erythema so will treat for possible bacterial or fungal laryngitis given history of steroid   - will treat for reflux and obtain esophageal work up   - needs to come off ARB since it can contribute to her cough  - symptoms 8/17/2022 are greatly improved after course of Augmentin, nystatin, and fluconazole as well as 2 cough suppression therapy sessions   - she has had her blood pressure medication changed since our last appointment as well  - she is on PPI BID   - scope shows new left true vocal fold redness, resolved pharyngeal redness  - the new left true vocal fold redness is new and likely due to coughing and will treat with another round of medications given restriction of vocal fold vibration bilaterally   - symptoms 10/4/2022 are increased cough, especially at night, with production of clear, sticky mucus, voice unchanged  - strobe shows resolved left vocal fold redness, postcricoid edema present, persistent changes over the vocal folds that are stable now  - symptoms are likely due to esophageal etiology given nighttime cough - undergoing work up now  - not on steroid inhaler anymore - vocal fold redness is resolved but vocal folds themselves have been having a stable phonotraumatic appearance  - sipping water is the most helpful cough suppression technique, does report knowing other ones like breathing but when asked to mimic breathing she proceeded to show panting with high supraclavicular breathes and reported that breathing technique does not work  - discussed proceeding with one in person session of therapy to work on breathing technique and demonstration of good suppression technique  Plan  - additional in person session with Raine Nicole, SLP to  improve breathing technique  - upcoming EDG with pH testing with Dr. Bautista on 10/13, follow up on esophageal testing  - follow up after completed sessions with SLP to see further medical treatment at that point and after complete esophageal work up    RETURN VISIT: follow up few weeks after speech therapy    Evelina Rodriguez MD    Laryngology    [facility] Voice [facility]  Department of  Otolaryngology - Head and Neck Surgery  [facility] [place] [address]  Appointment line: [phone]  Fax: [phone]  [email or web address]     Scribe Disclosure:  I, Gisela Lockhart, am serving as a scribe to document services personally performed by Evelina Rodriguez MD at this visit, based upon the provider's statements to me. All documentation has been reviewed by the aforementioned provider prior to being entered into the official medical record.      Again, thank you for allowing me to participate in the care of your patient.      Sincerely,    Evelina Rodriguez MD

## 2022-10-06 ENCOUNTER — TELEPHONE (OUTPATIENT)
Dept: GASTROENTEROLOGY | Facility: CLINIC | Age: 57
End: 2022-10-06

## 2022-10-06 NOTE — TELEPHONE ENCOUNTER
Patient scheduled for EGD/BRAVO on 10.13.22.     Covid test scheduled ?. Discuss at home test option.     Arrival time: 0825    Facility location: [facility]    Sedation type: MAC    Indication for procedure: Reflux, chronic cough    Anticoagulations? No     Pre visit planning completed.    Marisol Garcia RN

## 2022-10-07 NOTE — TELEPHONE ENCOUNTER
Attempted to contact patient with assistance from [employer]  ID [id number] regarding upcoming EGD/BRAVO procedure on 10.13.22 for pre assessment questions. No answer.     Left message to return call to [phone] #4    Marisol Garcia RN

## 2022-10-11 NOTE — TELEPHONE ENCOUNTER
jess  ID [id number]    2nd attempt    Attempted to contact patient regarding upcoming EGD/BRAVO procedure on 10.13.22 for pre assessment questions. No answer. Dialed both numbers listed in chart.    Left message to return call to [phone] #4    Patient is not MyChart active.    Marisol Garcia RN

## 2022-10-12 ENCOUNTER — ANESTHESIA EVENT (OUTPATIENT)
Dept: SURGERY | Facility: AMBULATORY SURGERY CENTER | Age: 57
End: 2022-10-12
Payer: COMMERCIAL

## 2022-10-12 NOTE — TELEPHONE ENCOUNTER
Elissa  via phone.    Third attempt for pre-assessment prior to upcoming EGD BRAVO.    No answer.  Left message to return call [phone] #4    Attempted to contact both numbers listed.    Araceli Thakkar RN

## 2022-10-12 NOTE — ANESTHESIA PREPROCEDURE EVALUATION
Anesthesia Pre-Procedure Evaluation    Patient: Tucker Foreman   MRN: [id number] : 1965        Procedure : Procedure(s):  ESOPHAGOGASTRODUODENOSCOPY, WITH BRAVO PH MONITORING DEVICE INSERTION          No past medical history on file.   No past surgical history on file.   No Known Allergies   Social History     Tobacco Use     Smoking status: Never     Smokeless tobacco: Never   Substance Use Topics     Alcohol use: Not on file      Wt Readings from Last 1 Encounters:   10/04/22 55.5 kg (122 lb 6.4 oz)           Physical Exam    Airway        Mallampati: II   TM distance: > 3 FB   Neck ROM: full   Mouth opening: > 3 cm    Respiratory Devices and Support         Dental  no notable dental history         Cardiovascular   cardiovascular exam normal          Pulmonary   pulmonary exam normal                OUTSIDE LABS:  CBC:   Lab Results   Component Value Date    HGB 14.5 03/10/2022     BMP: No results found for: NA, POTASSIUM, CHLORIDE, CO2, BUN, CR, GLC  COAGS: No results found for: PTT, INR, FIBR  POC: No results found for: BGM, HCG, HCGS  HEPATIC: No results found for: ALBUMIN, PROTTOTAL, ALT, AST, GGT, ALKPHOS, BILITOTAL, BILIDIRECT, VICENTE  OTHER: No results found for: PH, LACT, A1C, GWENDOLYN, PHOS, MAG, LIPASE, AMYLASE, TSH, T4, T3, CRP, SED    Anesthesia Plan    ASA Status:  2   NPO Status:  NPO Appropriate    Anesthesia Type: MAC.     - Reason for MAC: straight local not clinically adequate   Induction: Intravenous, Propofol.   Maintenance: TIVA.        Consents    Anesthesia Plan(s) and associated risks, benefits, and realistic alternatives discussed. Questions answered and patient/representative(s) expressed understanding.    - Discussed:     - Discussed with:  Patient      - Extended Intubation/Ventilatory Support Discussed: No.      - Patient is DNR/DNI Status: No    Use of blood products discussed: No .     Postoperative Care       PONV prophylaxis: Ondansetron (or other 5HT-3), Background Propofol Infusion      Comments:                Roney Mueller MD

## 2022-10-13 ENCOUNTER — HOSPITAL ENCOUNTER (OUTPATIENT)
Facility: AMBULATORY SURGERY CENTER | Age: 57
Discharge: HOME | End: 2022-10-13
Attending: INTERNAL MEDICINE
Payer: COMMERCIAL

## 2022-10-13 ENCOUNTER — TRANSFERRED RECORDS (OUTPATIENT)
Dept: HEALTH INFORMATION MANAGEMENT | Facility: CLINIC | Age: 57
End: 2022-10-13

## 2022-10-13 ENCOUNTER — ANESTHESIA (OUTPATIENT)
Dept: SURGERY | Facility: AMBULATORY SURGERY CENTER | Age: 57
End: 2022-10-13
Payer: COMMERCIAL

## 2022-10-13 ENCOUNTER — MEDICAL CORRESPONDENCE (OUTPATIENT)
Dept: HEALTH INFORMATION MANAGEMENT | Facility: CLINIC | Age: 57
End: 2022-10-13

## 2022-10-13 VITALS
WEIGHT: 122 LBS | DIASTOLIC BLOOD PRESSURE: 82 MMHG | OXYGEN SATURATION: 100 % | BODY MASS INDEX: 24.6 KG/M2 | RESPIRATION RATE: 14 BRPM | HEART RATE: 97 BPM | TEMPERATURE: 97.5 F | HEIGHT: 59 IN | SYSTOLIC BLOOD PRESSURE: 142 MMHG

## 2022-10-13 VITALS — HEART RATE: 93 BPM

## 2022-10-13 LAB — UPPER GI ENDOSCOPY: NORMAL

## 2022-10-13 PROCEDURE — 88305 TISSUE EXAM BY PATHOLOGIST: CPT | Mod: 26 | Performed by: STUDENT IN AN ORGANIZED HEALTH CARE EDUCATION/TRAINING PROGRAM

## 2022-10-13 PROCEDURE — 43239 EGD BIOPSY SINGLE/MULTIPLE: CPT

## 2022-10-13 PROCEDURE — 91035 G-ESOPH REFLX TST W/ELECTROD: CPT

## 2022-10-13 PROCEDURE — 88305 TISSUE EXAM BY PATHOLOGIST: CPT | Mod: TC | Performed by: INTERNAL MEDICINE

## 2022-10-13 PROCEDURE — 88342 IMHCHEM/IMCYTCHM 1ST ANTB: CPT | Mod: 26 | Performed by: STUDENT IN AN ORGANIZED HEALTH CARE EDUCATION/TRAINING PROGRAM

## 2022-10-13 RX ORDER — NALOXONE HYDROCHLORIDE 0.4 MG/ML
0.2 INJECTION, SOLUTION INTRAMUSCULAR; INTRAVENOUS; SUBCUTANEOUS
Status: DISCONTINUED | OUTPATIENT
Start: 2022-10-13 | End: 2022-10-14 | Stop reason: HOSPADM

## 2022-10-13 RX ORDER — FLUMAZENIL 0.1 MG/ML
0.2 INJECTION, SOLUTION INTRAVENOUS
Status: ACTIVE | OUTPATIENT
Start: 2022-10-13 | End: 2022-10-13

## 2022-10-13 RX ORDER — GLYCOPYRROLATE 0.2 MG/ML
INJECTION, SOLUTION INTRAMUSCULAR; INTRAVENOUS PRN
Status: DISCONTINUED | OUTPATIENT
Start: 2022-10-13 | End: 2022-10-13

## 2022-10-13 RX ORDER — LIDOCAINE 40 MG/G
CREAM TOPICAL
Status: DISCONTINUED | OUTPATIENT
Start: 2022-10-13 | End: 2022-10-13 | Stop reason: HOSPADM

## 2022-10-13 RX ORDER — ONDANSETRON 2 MG/ML
4 INJECTION INTRAMUSCULAR; INTRAVENOUS
Status: DISCONTINUED | OUTPATIENT
Start: 2022-10-13 | End: 2022-10-13 | Stop reason: HOSPADM

## 2022-10-13 RX ORDER — NALOXONE HYDROCHLORIDE 0.4 MG/ML
0.4 INJECTION, SOLUTION INTRAMUSCULAR; INTRAVENOUS; SUBCUTANEOUS
Status: DISCONTINUED | OUTPATIENT
Start: 2022-10-13 | End: 2022-10-14 | Stop reason: HOSPADM

## 2022-10-13 RX ORDER — PROPOFOL 10 MG/ML
INJECTION, EMULSION INTRAVENOUS PRN
Status: DISCONTINUED | OUTPATIENT
Start: 2022-10-13 | End: 2022-10-13

## 2022-10-13 RX ORDER — PROPOFOL 10 MG/ML
INJECTION, EMULSION INTRAVENOUS CONTINUOUS PRN
Status: DISCONTINUED | OUTPATIENT
Start: 2022-10-13 | End: 2022-10-13

## 2022-10-13 RX ORDER — SODIUM CHLORIDE, SODIUM LACTATE, POTASSIUM CHLORIDE, CALCIUM CHLORIDE 600; 310; 30; 20 MG/100ML; MG/100ML; MG/100ML; MG/100ML
INJECTION, SOLUTION INTRAVENOUS CONTINUOUS PRN
Status: DISCONTINUED | OUTPATIENT
Start: 2022-10-13 | End: 2022-10-13

## 2022-10-13 RX ORDER — ONDANSETRON 4 MG/1
4 TABLET, ORALLY DISINTEGRATING ORAL EVERY 6 HOURS PRN
Status: DISCONTINUED | OUTPATIENT
Start: 2022-10-13 | End: 2022-10-14 | Stop reason: HOSPADM

## 2022-10-13 RX ORDER — PROCHLORPERAZINE MALEATE 10 MG
10 TABLET ORAL EVERY 6 HOURS PRN
Status: DISCONTINUED | OUTPATIENT
Start: 2022-10-13 | End: 2022-10-14 | Stop reason: HOSPADM

## 2022-10-13 RX ORDER — LIDOCAINE HYDROCHLORIDE 20 MG/ML
INJECTION, SOLUTION INFILTRATION; PERINEURAL PRN
Status: DISCONTINUED | OUTPATIENT
Start: 2022-10-13 | End: 2022-10-13

## 2022-10-13 RX ORDER — ONDANSETRON 2 MG/ML
4 INJECTION INTRAMUSCULAR; INTRAVENOUS EVERY 6 HOURS PRN
Status: DISCONTINUED | OUTPATIENT
Start: 2022-10-13 | End: 2022-10-14 | Stop reason: HOSPADM

## 2022-10-13 RX ADMIN — LIDOCAINE HYDROCHLORIDE 60 MG: 20 INJECTION, SOLUTION INFILTRATION; PERINEURAL at 09:48

## 2022-10-13 RX ADMIN — GLYCOPYRROLATE 0.2 MG: 0.2 INJECTION, SOLUTION INTRAMUSCULAR; INTRAVENOUS at 09:47

## 2022-10-13 RX ADMIN — PROPOFOL 200 MCG/KG/MIN: 10 INJECTION, EMULSION INTRAVENOUS at 09:51

## 2022-10-13 RX ADMIN — SODIUM CHLORIDE, SODIUM LACTATE, POTASSIUM CHLORIDE, CALCIUM CHLORIDE: 600; 310; 30; 20 INJECTION, SOLUTION INTRAVENOUS at 09:19

## 2022-10-13 RX ADMIN — PROPOFOL 50 MG: 10 INJECTION, EMULSION INTRAVENOUS at 09:51

## 2022-10-13 RX ADMIN — PROPOFOL 50 MG: 10 INJECTION, EMULSION INTRAVENOUS at 09:48

## 2022-10-13 NOTE — ANESTHESIA POSTPROCEDURE EVALUATION
Patient: Tucker Foreman    Procedure: Procedure(s):  ESOPHAGOGASTRODUODENOSCOPY, WITH BIOPSY AND BRAVO PH MONITORING DEVICE INSERTION       Anesthesia Type:  MAC    Note:  Disposition: Outpatient   Postop Pain Control: Uneventful            Sign Out: Well controlled pain   PONV:    Neuro/Psych: Uneventful            Sign Out: Acceptable/Baseline neuro status   Airway/Respiratory: Uneventful            Sign Out: Acceptable/Baseline resp. status   CV/Hemodynamics: Uneventful            Sign Out: Acceptable CV status; No obvious hypovolemia; No obvious fluid overload   Other NRE:    DID A NON-ROUTINE EVENT OCCUR?            Last vitals:  Vitals Value Taken Time   /82 10/13/22 1051   Temp 36.4  C (97.5  F) 10/13/22 1051   Pulse     Resp 14 10/13/22 1051   SpO2 100 % 10/13/22 1051       Electronically Signed By: Roney Mueller MD  October 13, 2022  2:48 PM   Body Location Override (Optional - Billing Will Still Be Based On Selected Body Map Location If Applicable): left tip of nose

## 2022-10-13 NOTE — ANESTHESIA CARE TRANSFER NOTE
Patient: Tucker Foreman    Procedure: Procedure(s):  ESOPHAGOGASTRODUODENOSCOPY, WITH BIOPSY AND BRAVO PH MONITORING DEVICE INSERTION       Diagnosis: Chronic cough [R05.3]  Gastroesophageal reflux disease, unspecified whether esophagitis present [K21.9]  Diagnosis Additional Information: No value filed.    Anesthesia Type:   MAC     Note:    Oropharynx: oropharynx clear of all foreign objects and spontaneously breathing  Level of Consciousness: awake  Oxygen Supplementation: room air    Independent Airway: airway patency satisfactory and stable  Dentition: dentition unchanged  Vital Signs Stable: post-procedure vital signs reviewed and stable  Report to RN Given: handoff report given  Patient transferred to: Phase II    Handoff Report: Identifed the Patient, Identified the Reponsible Provider, Reviewed the pertinent medical history, Discussed the surgical course, Reviewed Intra-OP anesthesia mangement and issues during anesthesia, Set expectations for post-procedure period and Allowed opportunity for questions and acknowledgement of understanding      Vitals:  Vitals Value Taken Time   /89    Temp 98.0    Pulse 90    Resp 18    SpO2 96        Electronically Signed By: HILL ARVIZU  October 13, 2022  10:13 AM

## 2022-10-13 NOTE — H&P
Tucker Foreman  [id number]  female  57 year old      Reason for procedure/surgery: egd, bravo chronic cough    Patient Active Problem List   Diagnosis     Essential hypertension       Past Surgical History:  History reviewed. No pertinent surgical history.    Past Medical History: History reviewed. No pertinent past medical history.    Social History:   Social History     Tobacco Use     Smoking status: Never     Smokeless tobacco: Never   Substance Use Topics     Alcohol use: Not on file       Family History: History reviewed. No pertinent family history.    Allergies: No Known Allergies    Active Medications:   Current Outpatient Medications   Medication Sig Dispense Refill     albuterol (PROAIR HFA/PROVENTIL HFA/VENTOLIN HFA) 108 (90 Base) MCG/ACT inhaler Inhale 2 puffs into the lungs every 6 hours as needed for shortness of breath / dyspnea or wheezing       amLODIPine (NORVASC) 10 MG tablet TAKE 1 PILL BY MOUTH EVERY DAY/TXHUA HNUB NOJ 1 LUB TSHUAJ PAB ZOO NTSHAV SIAB       atorvastatin (LIPITOR) 20 MG tablet TAKE 1 PILL BY MOUTH EVERY DAY/TXHUA HNUB NOJ 1 LUB TSHUAJ PAB ZOO NTSHAV MUAJ ROJ       benzonatate (TESSALON) 200 MG capsule Take 1 capsule (200 mg) by mouth 3 times daily as needed for cough 90 capsule 4     budesonide-formoterol (SYMBICORT) 160-4.5 MCG/ACT Inhaler Inhale 2 puffs into the lungs 2 times daily       cetirizine (ZYRTEC) 10 MG tablet Take 10 mg by mouth daily       citalopram (CELEXA) 40 MG tablet Take 40 mg by mouth daily       EYE ITCH RELIEF 0.025 % ophthalmic solution PLACE 1 DROP IN EACH EYE 2 TIMES A DAY (EVERY 8 TO 12 HOURS) AS NEEDED       fish oil-omega-3 fatty acids 1000 MG capsule TAKE 1 PILL BY MOUTH TWICE DAILY/ TXHUA HNUB NOJ 1 LUB 2 ZAUG PAB NTSHAV MUAJ ROJ       ibuprofen (ADVIL/MOTRIN) 800 MG tablet Take 800 mg by mouth every 8 hours as needed for moderate pain       losartan (COZAAR) 100 MG tablet Take 100 mg by mouth daily       metoprolol succinate ER (TOPROL-XL) 100 MG 24  "hr tablet TAKE 1 PILL BY MOUTH DAILY FOR BLOOD PRESSURE / TXHUA HNUB NOJ 1 LUB [facility] NTSHAV SIAB       NONFORMULARY OTC dry eyes drops as needed       nystatin (MYCOSTATIN) 390015 UNIT/ML suspension Take by mouth 4 times daily Take 1 teaspoonful by mouth 4 times daily for 10 days 280 mL 0     polyethylene glycol-propylene glycol (SYSTANE ULTRA) 0.4-0.3 % SOLN ophthalmic solution Place 1 drop into both eyes 4 times daily as needed for dry eyes       QUEtiapine (SEROQUEL) 50 MG tablet Take 50 mg by mouth At Bedtime       SYSTANE PRESERVATIVE FREE 0.4-0.3 % SOLN opthalmic solution PUT 1-2 DROPS IN EACH EYE 4 TIMES A DAY AS NEEDED. DISCARD EACH VIAL AFTER SINGLE USE       topiramate (TOPAMAX) 25 MG tablet TAKE 1 PILL BY MOUTH EVERY DAY FOR HEADACHE/TXHUA HNUB NOJ 1 [facility] MOB MELISSA GREGORIO *NEEDS TO BE SEEN*       losartan (COZAAR) 50 MG tablet TAKE 1 PILL BY MOUTH DAILY FOR BLOOD PRESSURE / TXHUA HNUB NOJ 1 LUB [facility] NTSHAV SIAB       LUBRICATING PLUS EYE DROPS 0.5 % ophthalmic solution PF FORMULATION. PUT 1-2 DROPS IN EACH EYE AS NEEDED. DISCARD EACH VIAL AFTER SINGLE USE.         Systemic Review:   CONSTITUTIONAL: NEGATIVE for fever, chills, change in weight  ENT/MOUTH: NEGATIVE for ear, mouth and throat problems  RESP: NEGATIVE for significant cough or SOB  CV: NEGATIVE for chest pain, palpitations or peripheral edema    Physical Examination:   Vital Signs: BP (!) 170/97 (BP Location: Right arm)   Pulse 97   Temp 97  F (36.1  C) (Temporal)   Resp 16   Ht 1.499 m (4' 11\")   Wt 55.3 kg (122 lb)   SpO2 100%   BMI 24.64 kg/m    GENERAL: healthy, alert and no distress  NECK: no adenopathy, no asymmetry, masses, or scars  RESP: lungs clear to auscultation - no rales, rhonchi or wheezes  CV: regular rate and rhythm, normal S1 S2, no S3 or S4, no murmur, click or rub, no peripheral edema and peripheral pulses strong  ABDOMEN: soft, nontender, no hepatosplenomegaly, no masses and bowel " sounds normal  MS: no gross musculoskeletal defects noted, no edema    Plan: Appropriate to proceed as scheduled.      Aldair Bautista DO  10/13/2022    PCP:  Roxana Lee

## 2022-10-13 NOTE — LETTER
"October 19, 2022      Tucker Foreman  [address]  [place] [address]        Dear ,    We are writing to inform you of your test results.    You have findings of intestinal metaplasia in the stomach, you should undergo a repeat endoscopy with something called mapping biopsies. We will discuss this more at your upcoming visit.     Resulted Orders   Surgical Pathology Exam   Result Value Ref Range    Case Report       Surgical Pathology Report                         Case: [id number]                                  Authorizing Provider:  Aldair Bautista DO          Collected:           10/13/2022 09:52 AM          Ordering Location:     [facility] OR  Received:            10/13/2022 10:10 AM                                 [facility]                                                                  Pathologist:           Zoraida Miner MD                                                          Specimen:    Stomach, Gastric Biopsies                                                                  Final Diagnosis       A. 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 Pylori immunohistochemical stain is negative  -Negative for dysplasia         Clinical Information       Gastroesophageal reflux disease      Gross Description       A(1). Stomach, Gastric Biopsies:  The specimen is received in formalin with proper patient identification, labeled \"gastric biopsies\".  The specimen consists of 3 irregular tan pieces of soft tissue averaging 0.3 cm in greatest dimension which are entirely submitted in cassette A1.      Microscopic Description       Microscopic examination has been performed.         Performing Labs       The technical component of this testing was completed at [facility]      Case Images         If you have any questions or concerns, please call the " clinic at the number listed above.       Sincerely,      Aldair Bautisat, DO

## 2022-10-18 ENCOUNTER — ALLIED HEALTH/NURSE VISIT (OUTPATIENT)
Dept: GASTROENTEROLOGY | Facility: CLINIC | Age: 57
End: 2022-10-18
Payer: COMMERCIAL

## 2022-10-18 DIAGNOSIS — R05.3 CHRONIC COUGH: Primary | ICD-10-CM

## 2022-10-18 DIAGNOSIS — K21.9 GASTROESOPHAGEAL REFLUX DISEASE, UNSPECIFIED WHETHER ESOPHAGITIS PRESENT: ICD-10-CM

## 2022-10-18 LAB
PATH REPORT.COMMENTS IMP SPEC: NORMAL
PATH REPORT.COMMENTS IMP SPEC: NORMAL
PATH REPORT.FINAL DX SPEC: NORMAL
PATH REPORT.GROSS SPEC: NORMAL
PATH REPORT.MICROSCOPIC SPEC OTHER STN: NORMAL
PATH REPORT.RELEVANT HX SPEC: NORMAL
PHOTO IMAGE: NORMAL

## 2022-11-08 ENCOUNTER — DOCUMENTATION ONLY (OUTPATIENT)
Dept: OTOLARYNGOLOGY | Facility: CLINIC | Age: 57
End: 2022-11-08

## 2022-11-08 NOTE — PROGRESS NOTES
pH testing 10/18/22    The overall % acid exposure of 1.1 and DeMeester overall of 4.5 was normal not indicating ongoing acid reflux    There were 4 occurrences of cough, and 4 of regurgitation symptoms. SI was 0 (0/4), 0 (0/4) respectively overall for acid reflux which indicates a less than complete correlation.

## 2022-11-21 ENCOUNTER — TELEPHONE (OUTPATIENT)
Dept: GASTROENTEROLOGY | Facility: CLINIC | Age: 57
End: 2022-11-21

## 2022-11-21 NOTE — TELEPHONE ENCOUNTER
"Below message sent to PCP asking to follow up with pt in regards to canceled OV today:     \"Pt was scheduled for an OV today however canceled on their own accord. At the visit I wanted to discuss recent test results. Ultimately she was found to have gastrointestinal metaplasia and with her ethnic background she should undergo EGD for gastric mapping. In addition I do not see any routine colorectal cancer screening. I would also recommend colonoscopy at the same time as EGD or other non invasive testing as she is over due for her screening.\"   "

## 2023-01-07 ENCOUNTER — DOCUMENTATION ONLY (OUTPATIENT)
Dept: OTOLARYNGOLOGY | Facility: CLINIC | Age: 58
End: 2023-01-07

## 2023-01-07 NOTE — PROGRESS NOTES
pH testing 10/18/22      This was a normal study OFF therapy. However, there is significant data loss over the course of the study where the  was not in close enough proximity to the patient's body. This makes the findings unreliable.  The overall % acid exposure of 1.1 and DeMeester overall of 4.5 was normal not indicating ongoing acid reflux.  There were 4 occurrences of cough, and 4 of regurgitation symptoms. SI was 0 (0/4), 0 (0/4) respectively overall for acid reflux which indicates a less than complete correlation. The SAP was 0, 0 respectively indicating a less than complete correlation. Interpret within clinical context.     Day 1  0.4% acid exposure, DeMeester Score 19.6   Day 2  0.2% acid exposure, DeMeester Score 0.9   Day 3  0.9% acid exposure, DeMeester Score 4.1   Day 4  8.4% acid exposure, DeMeester Score 22.8 (only 3hrs of time available for analysis)     This study was interpreted on 11.7.22     Wording of procedure description and interpretation was adapted from [facility] [employer] Weekly [employer] Conference (2018). Analysis of the data was self-performed.

## (undated) DEVICE — SYR 30ML SLIP TIP W/O NDL 302833

## (undated) DEVICE — ENDO FORCEP SPIKED SERRATED SHAFT JUMBO 239CM G56998

## (undated) DEVICE — SPECIMEN CONTAINER 60MLW/10% FORMALIN 59601

## (undated) DEVICE — GOWN IMPERVIOUS 2XL BLUE

## (undated) DEVICE — KIT ENDO TURNOVER/PROCEDURE CARRY-ON 101822

## (undated) DEVICE — ENDO BITE BLOCK ADULT OMNI-BLOC

## (undated) DEVICE — SUCTION MANIFOLD NEPTUNE 2 SYS 1 PORT 702-025-000

## (undated) DEVICE — TUBING SUCTION 12"X1/4" N612

## (undated) DEVICE — SOL WATER IRRIG 500ML BOTTLE 2F7113

## (undated) DEVICE — GLOVE EXAM NITRILE LG PF LATEX FREE 5064

## (undated) RX ORDER — LIDOCAINE HYDROCHLORIDE 20 MG/ML
SOLUTION OROPHARYNGEAL
Status: DISPENSED
Start: 2022-10-04

## (undated) RX ORDER — LIDOCAINE HYDROCHLORIDE 20 MG/ML
SOLUTION OROPHARYNGEAL
Status: DISPENSED
Start: 2022-08-23

## (undated) RX ORDER — LIDOCAINE HYDROCHLORIDE 20 MG/ML
SOLUTION OROPHARYNGEAL
Status: DISPENSED
Start: 2022-09-06